# Patient Record
Sex: MALE | Race: WHITE | Employment: FULL TIME | URBAN - METROPOLITAN AREA
[De-identification: names, ages, dates, MRNs, and addresses within clinical notes are randomized per-mention and may not be internally consistent; named-entity substitution may affect disease eponyms.]

---

## 2018-05-24 ENCOUNTER — OFFICE VISIT (OUTPATIENT)
Dept: FAMILY MEDICINE | Facility: CLINIC | Age: 53
End: 2018-05-24
Payer: COMMERCIAL

## 2018-05-24 VITALS
RESPIRATION RATE: 16 BRPM | WEIGHT: 241.25 LBS | DIASTOLIC BLOOD PRESSURE: 101 MMHG | SYSTOLIC BLOOD PRESSURE: 141 MMHG | HEIGHT: 76 IN | BODY MASS INDEX: 29.38 KG/M2 | HEART RATE: 105 BPM | OXYGEN SATURATION: 97 % | TEMPERATURE: 98.7 F

## 2018-05-24 DIAGNOSIS — M10.9 ACUTE GOUTY ARTHRITIS: ICD-10-CM

## 2018-05-24 DIAGNOSIS — I10 BENIGN ESSENTIAL HYPERTENSION: Primary | ICD-10-CM

## 2018-05-24 DIAGNOSIS — E78.5 HYPERLIPIDEMIA LDL GOAL <100: ICD-10-CM

## 2018-05-24 DIAGNOSIS — R73.01 IMPAIRED FASTING GLUCOSE: ICD-10-CM

## 2018-05-24 LAB
ALBUMIN SERPL-MCNC: 3.6 G/DL (ref 3.4–5)
ALP SERPL-CCNC: 76 U/L (ref 40–150)
ALT SERPL W P-5'-P-CCNC: 20 U/L (ref 0–70)
ANION GAP SERPL CALCULATED.3IONS-SCNC: 9 MMOL/L (ref 3–14)
AST SERPL W P-5'-P-CCNC: 10 U/L (ref 0–45)
BILIRUB SERPL-MCNC: 0.6 MG/DL (ref 0.2–1.3)
BUN SERPL-MCNC: 16 MG/DL (ref 7–30)
CALCIUM SERPL-MCNC: 9.5 MG/DL (ref 8.5–10.1)
CHLORIDE SERPL-SCNC: 108 MMOL/L (ref 94–109)
CHOLEST SERPL-MCNC: 177 MG/DL
CO2 SERPL-SCNC: 26 MMOL/L (ref 20–32)
CREAT SERPL-MCNC: 1.09 MG/DL (ref 0.66–1.25)
GFR SERPL CREATININE-BSD FRML MDRD: 70 ML/MIN/1.7M2
GLUCOSE SERPL-MCNC: 97 MG/DL (ref 70–99)
HBA1C MFR BLD: 5.1 % (ref 0–5.6)
HDLC SERPL-MCNC: 37 MG/DL
LDLC SERPL CALC-MCNC: 107 MG/DL
NONHDLC SERPL-MCNC: 140 MG/DL
POTASSIUM SERPL-SCNC: 4.9 MMOL/L (ref 3.4–5.3)
PROT SERPL-MCNC: 7.8 G/DL (ref 6.8–8.8)
SODIUM SERPL-SCNC: 143 MMOL/L (ref 133–144)
TRIGL SERPL-MCNC: 167 MG/DL
URATE SERPL-MCNC: 6.2 MG/DL (ref 3.5–7.2)

## 2018-05-24 PROCEDURE — 80053 COMPREHEN METABOLIC PANEL: CPT | Performed by: FAMILY MEDICINE

## 2018-05-24 PROCEDURE — 83036 HEMOGLOBIN GLYCOSYLATED A1C: CPT | Performed by: FAMILY MEDICINE

## 2018-05-24 PROCEDURE — 99214 OFFICE O/P EST MOD 30 MIN: CPT | Performed by: FAMILY MEDICINE

## 2018-05-24 PROCEDURE — 84550 ASSAY OF BLOOD/URIC ACID: CPT | Performed by: FAMILY MEDICINE

## 2018-05-24 PROCEDURE — 80061 LIPID PANEL: CPT | Performed by: FAMILY MEDICINE

## 2018-05-24 PROCEDURE — 36415 COLL VENOUS BLD VENIPUNCTURE: CPT | Performed by: FAMILY MEDICINE

## 2018-05-24 RX ORDER — SIMVASTATIN 10 MG
10 TABLET ORAL DAILY
Refills: 3 | COMMUNITY
Start: 2018-03-25 | End: 2018-09-29

## 2018-05-24 RX ORDER — LISINOPRIL 5 MG/1
5 TABLET ORAL DAILY
Qty: 30 TABLET | Refills: 3 | Status: SHIPPED | OUTPATIENT
Start: 2018-05-24 | End: 2018-09-16

## 2018-05-24 RX ORDER — INDOMETHACIN 50 MG/1
50 CAPSULE ORAL
Qty: 42 CAPSULE | Refills: 1 | Status: SHIPPED | OUTPATIENT
Start: 2018-05-24 | End: 2019-04-18

## 2018-05-24 NOTE — PROGRESS NOTES
SUBJECTIVE:   Adelfo Rivera is a 53 year old male who presents to clinic today for the following health issues:    HPI    Well-known to me from previous practice here to follow up on chronic medical conditions.    He has had sudden increased pain at base of LEFT great toe.  It is red, hot and swollen.  He has never had gout before.  Pain is exquisite to this site.  No other locations affected.    Blood pressure remains elevated.  He has been checking at home with a cuff from [facility] with variable results.  His dbp has never been < 87.  Denies any CP or SOB or HA.    He is on a statin for elevated lipids.  He is due for labs today.    He has history of impaired glucose.  He desires lab to recheck today.    Current Chronic Medical Conditions:  Hyperlipidemia on statin  elevated blood pressure without dx of HTN  impaired fasting glucose    Past Medical History  LLE DVT  of unknown etiology-- on warfarin x 3 months    Family History  Dad-  age 82 from CAD, s/p heart valve replacement, CVA at age 71  Mom- alive and healthy  4 sisters healthy    Social History   to wife Sarah (previous kidney cancer  rxed at [facility]). 2 kids-- age 24F(step) and 18F(biological). Works as  for [employer]- travels long distances in car frequently for job. Nonsmoker.    Problem list and histories reviewed & adjusted, as indicated.  Additional history: as documented        Patient Active Problem List   Diagnosis     Hyperlipidemia LDL goal <100     Acute gouty arthritis     Benign essential hypertension     Impaired fasting glucose     History reviewed. No pertinent surgical history.    Social History   Substance Use Topics     Smoking status: Never Smoker     Smokeless tobacco: Never Used     Alcohol use Yes      Comment: once in a while      Family History   Problem Relation Age of Onset     No Known Problems Mother      No Known Problems Father          Current Outpatient Prescriptions   Medication Sig  "Dispense Refill     BABY ASPIRIN PO Take by mouth daily       Fish Oil-Cholecalciferol (FISH OIL + D3 PO)        indomethacin (INDOCIN) 50 MG capsule Take 1 capsule (50 mg) by mouth 3 times daily (with meals) 42 capsule 1     lisinopril (PRINIVIL/ZESTRIL) 5 MG tablet Take 1 tablet (5 mg) by mouth daily 30 tablet 3     simvastatin (ZOCOR) 10 MG tablet Take 10 mg by mouth daily  3     UNABLE TO FIND MEDICATION NAME: violeta medication- take it daily.       Not on File  BP Readings from Last 3 Encounters:   05/24/18 (!) 141/101    Wt Readings from Last 3 Encounters:   05/24/18 241 lb 4 oz (109.4 kg)                    ROS:  Constitutional, HEENT, cardiovascular, pulmonary, gi and gu systems are negative, except as otherwise noted.    OBJECTIVE:     BP (!) 141/101  Pulse 105  Temp 98.7  F (37.1  C) (Oral)  Resp 16  Ht 6' 4\" (1.93 m)  Wt 241 lb 4 oz (109.4 kg)  SpO2 97%  BMI 29.37 kg/m2  Body mass index is 29.37 kg/(m^2).  GENERAL: healthy, alert and no distress  EYES: Eyes grossly normal to inspection, PERRL and conjunctivae and sclerae normal  NECK: no adenopathy, no asymmetry, masses, or scars and thyroid normal to palpation  MS: no gross musculoskeletal defects noted, base of LEFT great toe at MTP joint red, hot, swollen, pain with movement  SKIN: no suspicious lesions or rashes  NEURO: Normal strength and tone, mentation intact and speech normal  PSYCH: mentation appears normal, affect normal/bright    ASSESSMENT/PLAN:     1. Acute gouty arthritis    - Uric acid  - indomethacin (INDOCIN) 50 MG capsule; Take 1 capsule (50 mg) by mouth 3 times daily (with meals)  Dispense: 42 capsule; Refill: 1    Rx for Indocin today for pain relief.  Recommend increased fluids.  Uric acid level pending.  FU if no change or worsening symptoms prn.    2. Benign essential hypertension    - Comprehensive metabolic panel  - lisinopril (PRINIVIL/ZESTRIL) 5 MG tablet; Take 1 tablet (5 mg) by mouth daily  Dispense: 30 tablet; Refill: " 3    Will start lisinopril 5 mg once daily.  Recheck in 2 weeks for further titration prn.    3. Hyperlipidemia LDL goal <100    - Lipid panel reflex to direct LDL Fasting    On statin- recheck of labs today.    4. Impaired fasting glucose    - Hemoglobin A1c  - Comprehensive metabolic panel    Recheck labs today.  Continues to work at diet and exercise and weight management.    Patient is well-known to me from previous practice where he was an established patient of mine at [facility].    Gemma Infante MD  [facility]

## 2018-05-24 NOTE — MR AVS SNAPSHOT
"              After Visit Summary   2018    Adelfo Rivera    MRN: [id number]           Patient Information     Date Of Birth          1965        Visit Information        Provider Department      2018 9:20 AM Gemma Infante MD [facility]        Today's Diagnoses     Benign essential hypertension    -  1    Acute gouty arthritis        Hyperlipidemia LDL goal <100        Impaired fasting glucose           Follow-ups after your visit        Who to contact     If you have questions or need follow up information about today's clinic visit or your schedule please contact [facility] directly at [phone].  Normal or non-critical lab and imaging results will be communicated to you by PoshVinehart, letter or phone within 4 business days after the clinic has received the results. If you do not hear from us within 7 days, please contact the clinic through PoshVinehart or phone. If you have a critical or abnormal lab result, we will notify you by phone as soon as possible.  Submit refill requests through Silent Power or call your pharmacy and they will forward the refill request to us. Please allow 3 business days for your refill to be completed.          Additional Information About Your Visit        MyChart Information     Sil[employer] lets you send messages to your doctor, view your test results, renew your prescriptions, schedule appointments and more. To sign up, go to [email or web address] . Click on \"Log in\" on the left side of the screen, which will take you to the Welcome page. Then click on \"Sign up Now\" on the right side of the page.     You will be asked to enter the access code listed below, as well as some personal information. Please follow the directions to create your username and password.     Your access code is: [email or web address]  Expires: 2018 10:02 AM     Your access code will  in 90 days. If you need help or a new code, please call " "your [facility] clinic or [phone].        Care EveryWhere ID     This is your Care EveryWhere ID. This could be used by other organizations to access your [facility] medical records  [id number]        Your Vitals Were     Pulse Temperature Respirations Height Pulse Oximetry BMI (Body Mass Index)    105 98.7  F (37.1  C) (Oral) 16 6' 4\" (1.93 m) 97% 29.37 kg/m2       Blood Pressure from Last 3 Encounters:   05/24/18 (!) 141/101    Weight from Last 3 Encounters:   05/24/18 241 lb 4 oz (109.4 kg)              We Performed the Following     Comprehensive metabolic panel     Hemoglobin A1c     Lipid panel reflex to direct LDL Fasting     Uric acid          Today's Medication Changes          These changes are accurate as of 5/24/18 10:02 AM.  If you have any questions, ask your nurse or doctor.               Start taking these medicines.        Dose/Directions    indomethacin 50 MG capsule   Commonly known as:  INDOCIN   Used for:  Acute gouty arthritis   Started by:  Gemma Infante MD        Dose:  50 mg   Take 1 capsule (50 mg) by mouth 3 times daily (with meals)   Quantity:  42 capsule   Refills:  1       lisinopril 5 MG tablet   Commonly known as:  PRINIVIL/ZESTRIL   Used for:  Benign essential hypertension   Started by:  Gemma Infante MD        Dose:  5 mg   Take 1 tablet (5 mg) by mouth daily   Quantity:  30 tablet   Refills:  3            Where to get your medicines      These medications were sent to [facility]/pharmacy #[id number] - [facility] [address]  [address] [facility] [address]     Phone:  [phone]     indomethacin 50 MG capsule    lisinopril 5 MG tablet                Primary Care Provider Office Phone # Fax #    Gemma Infante [phone] [address] [place] [address]        Equal Access to Services     MASSIMO BAHENA AH: Mike Moctezuma, mecca heck, qaybta kaalmadionisio " ronda cabreraeliceo busbyaan ah. Yadira [facility] [phone].    ATENCIÓN: Si habla tawny, tiene a dias disposición servicios gratuitos de asistencia lingüística. Branden al [phone].    We comply with applicable federal civil rights laws and [place] laws. We do not discriminate on the basis of race, color, national origin, age, disability, sex, sexual orientation, or gender identity.            Thank you!     Thank you for choosing [facility]  for your care. Our goal is always to provide you with excellent care. Hearing back from our patients is one way we can continue to improve our services. Please take a few minutes to complete the written survey that you may receive in the mail after your visit with us. Thank you!             Your Updated Medication List - Protect others around you: Learn how to safely use, store and throw away your medicines at www.disposemymeds.org.          This list is accurate as of 5/24/18 10:02 AM.  Always use your most recent med list.                   Brand Name Dispense Instructions for use Diagnosis    BABY ASPIRIN PO      Take by mouth daily        FISH OIL + D3 PO           indomethacin 50 MG capsule    INDOCIN    42 capsule    Take 1 capsule (50 mg) by mouth 3 times daily (with meals)    Acute gouty arthritis       lisinopril 5 MG tablet    PRINIVIL/ZESTRIL    30 tablet    Take 1 tablet (5 mg) by mouth daily    Benign essential hypertension       simvastatin 10 MG tablet    ZOCOR     Take 10 mg by mouth daily        UNABLE TO FIND      MEDICATION NAME: naicin medication- take it daily.

## 2018-05-25 ENCOUNTER — TELEPHONE (OUTPATIENT)
Dept: FAMILY MEDICINE | Facility: CLINIC | Age: 53
End: 2018-05-25

## 2018-05-25 NOTE — TELEPHONE ENCOUNTER
Form completed.  Form faxed to attn: Data Department [phone].    A copy mailed to patient's home address on file and a copy sent down for abstraction.     Cecilia Etienne MA

## 2018-09-29 DIAGNOSIS — E78.5 HYPERLIPIDEMIA LDL GOAL <100: Primary | ICD-10-CM

## 2018-09-29 NOTE — TELEPHONE ENCOUNTER
"Requested Prescriptions   Pending Prescriptions Disp Refills     simvastatin (ZOCOR) 10 MG tablet  Medication is historical  Last Written Prescription Date:  3/25/2018  Last Fill Quantity: ,  # refills: 3   Last office visit: 5/24/2018 with prescribing provider:  Naresh   Future Office Visit:     30 tablet 3     Sig: Take 1 tablet (10 mg) by mouth daily    Statins Protocol Passed    9/29/2018  8:49 AM       Passed - LDL on file in past 12 months    Recent Labs   Lab Test  05/24/18   1005   LDL  107*            Passed - No abnormal creatine kinase in past 12 months    No lab results found.            Passed - Recent (12 mo) or future (30 days) visit within the authorizing provider's specialty    Patient had office visit in the last 12 months or has a visit in the next 30 days with authorizing provider or within the authorizing provider's specialty.  See \"Patient Info\" tab in inbasket, or \"Choose Columns\" in Meds & Orders section of the refill encounter.           Passed - Patient is age 18 or older          "

## 2018-10-01 RX ORDER — SIMVASTATIN 10 MG
10 TABLET ORAL DAILY
Qty: 90 TABLET | Refills: 3 | Status: SHIPPED | OUTPATIENT
Start: 2018-10-01 | End: 2019-04-18

## 2019-02-08 ENCOUNTER — TELEPHONE (OUTPATIENT)
Dept: URGENT CARE | Facility: URGENT CARE | Age: 54
End: 2019-02-08

## 2019-02-08 ENCOUNTER — TELEPHONE (OUTPATIENT)
Dept: FAMILY MEDICINE | Facility: CLINIC | Age: 54
End: 2019-02-08

## 2019-02-08 NOTE — TELEPHONE ENCOUNTER
PT declined scheduling BP check at the moment. He states that he will call back and schedule that with his yearly physical.     Carmen Wood MA

## 2019-04-18 ENCOUNTER — OFFICE VISIT (OUTPATIENT)
Dept: FAMILY MEDICINE | Facility: CLINIC | Age: 54
End: 2019-04-18
Payer: COMMERCIAL

## 2019-04-18 VITALS
RESPIRATION RATE: 16 BRPM | BODY MASS INDEX: 27.03 KG/M2 | HEIGHT: 76 IN | SYSTOLIC BLOOD PRESSURE: 134 MMHG | HEART RATE: 108 BPM | WEIGHT: 222 LBS | OXYGEN SATURATION: 99 % | TEMPERATURE: 98.5 F | DIASTOLIC BLOOD PRESSURE: 87 MMHG

## 2019-04-18 DIAGNOSIS — Z12.5 SCREENING FOR PROSTATE CANCER: ICD-10-CM

## 2019-04-18 DIAGNOSIS — E78.5 HYPERLIPIDEMIA LDL GOAL <100: ICD-10-CM

## 2019-04-18 DIAGNOSIS — Z00.00 ROUTINE GENERAL MEDICAL EXAMINATION AT A HEALTH CARE FACILITY: Primary | ICD-10-CM

## 2019-04-18 DIAGNOSIS — M10.9 ACUTE GOUTY ARTHRITIS: ICD-10-CM

## 2019-04-18 DIAGNOSIS — R73.01 IMPAIRED FASTING GLUCOSE: ICD-10-CM

## 2019-04-18 DIAGNOSIS — I10 BENIGN ESSENTIAL HYPERTENSION: ICD-10-CM

## 2019-04-18 LAB
ALBUMIN SERPL-MCNC: 3.8 G/DL (ref 3.4–5)
ALP SERPL-CCNC: 77 U/L (ref 40–150)
ALT SERPL W P-5'-P-CCNC: 22 U/L (ref 0–70)
ANION GAP SERPL CALCULATED.3IONS-SCNC: 9 MMOL/L (ref 3–14)
AST SERPL W P-5'-P-CCNC: 16 U/L (ref 0–45)
BASOPHILS # BLD AUTO: 0 10E9/L (ref 0–0.2)
BASOPHILS NFR BLD AUTO: 0.3 %
BILIRUB SERPL-MCNC: 0.6 MG/DL (ref 0.2–1.3)
BUN SERPL-MCNC: 12 MG/DL (ref 7–30)
CALCIUM SERPL-MCNC: 9.7 MG/DL (ref 8.5–10.1)
CHLORIDE SERPL-SCNC: 106 MMOL/L (ref 94–109)
CHOLEST SERPL-MCNC: 232 MG/DL
CO2 SERPL-SCNC: 24 MMOL/L (ref 20–32)
CREAT SERPL-MCNC: 0.87 MG/DL (ref 0.66–1.25)
DIFFERENTIAL METHOD BLD: NORMAL
EOSINOPHIL # BLD AUTO: 0.1 10E9/L (ref 0–0.7)
EOSINOPHIL NFR BLD AUTO: 0.9 %
ERYTHROCYTE [DISTWIDTH] IN BLOOD BY AUTOMATED COUNT: 13.9 % (ref 10–15)
GFR SERPL CREATININE-BSD FRML MDRD: >90 ML/MIN/{1.73_M2}
GLUCOSE SERPL-MCNC: 91 MG/DL (ref 70–99)
HBA1C MFR BLD: 5 % (ref 0–5.6)
HCT VFR BLD AUTO: 45.4 % (ref 40–53)
HCV AB SERPL QL IA: NONREACTIVE
HDLC SERPL-MCNC: 55 MG/DL
HGB BLD-MCNC: 15.1 G/DL (ref 13.3–17.7)
HIV 1+2 AB+HIV1 P24 AG SERPL QL IA: NONREACTIVE
LDLC SERPL CALC-MCNC: 149 MG/DL
LYMPHOCYTES # BLD AUTO: 1 10E9/L (ref 0.8–5.3)
LYMPHOCYTES NFR BLD AUTO: 14.9 %
MCH RBC QN AUTO: 30 PG (ref 26.5–33)
MCHC RBC AUTO-ENTMCNC: 33.3 G/DL (ref 31.5–36.5)
MCV RBC AUTO: 90 FL (ref 78–100)
MONOCYTES # BLD AUTO: 0.7 10E9/L (ref 0–1.3)
MONOCYTES NFR BLD AUTO: 9.9 %
NEUTROPHILS # BLD AUTO: 5.2 10E9/L (ref 1.6–8.3)
NEUTROPHILS NFR BLD AUTO: 74 %
NONHDLC SERPL-MCNC: 177 MG/DL
PLATELET # BLD AUTO: 180 10E9/L (ref 150–450)
POTASSIUM SERPL-SCNC: 4.2 MMOL/L (ref 3.4–5.3)
PROT SERPL-MCNC: 7.5 G/DL (ref 6.8–8.8)
PSA SERPL-ACNC: 0.95 UG/L (ref 0–4)
RBC # BLD AUTO: 5.03 10E12/L (ref 4.4–5.9)
SODIUM SERPL-SCNC: 139 MMOL/L (ref 133–144)
TRIGL SERPL-MCNC: 141 MG/DL
URATE SERPL-MCNC: 6.9 MG/DL (ref 3.5–7.2)
WBC # BLD AUTO: 7 10E9/L (ref 4–11)

## 2019-04-18 PROCEDURE — 36415 COLL VENOUS BLD VENIPUNCTURE: CPT | Performed by: FAMILY MEDICINE

## 2019-04-18 PROCEDURE — 99396 PREV VISIT EST AGE 40-64: CPT | Mod: 25 | Performed by: FAMILY MEDICINE

## 2019-04-18 PROCEDURE — 84550 ASSAY OF BLOOD/URIC ACID: CPT | Performed by: FAMILY MEDICINE

## 2019-04-18 PROCEDURE — 83036 HEMOGLOBIN GLYCOSYLATED A1C: CPT | Performed by: FAMILY MEDICINE

## 2019-04-18 PROCEDURE — 86803 HEPATITIS C AB TEST: CPT | Performed by: FAMILY MEDICINE

## 2019-04-18 PROCEDURE — 99214 OFFICE O/P EST MOD 30 MIN: CPT | Mod: 25 | Performed by: FAMILY MEDICINE

## 2019-04-18 PROCEDURE — 80053 COMPREHEN METABOLIC PANEL: CPT | Performed by: FAMILY MEDICINE

## 2019-04-18 PROCEDURE — 85025 COMPLETE CBC W/AUTO DIFF WBC: CPT | Performed by: FAMILY MEDICINE

## 2019-04-18 PROCEDURE — G0103 PSA SCREENING: HCPCS | Performed by: FAMILY MEDICINE

## 2019-04-18 PROCEDURE — 90715 TDAP VACCINE 7 YRS/> IM: CPT | Performed by: FAMILY MEDICINE

## 2019-04-18 PROCEDURE — 90471 IMMUNIZATION ADMIN: CPT | Performed by: FAMILY MEDICINE

## 2019-04-18 PROCEDURE — 87389 HIV-1 AG W/HIV-1&-2 AB AG IA: CPT | Performed by: FAMILY MEDICINE

## 2019-04-18 PROCEDURE — 80061 LIPID PANEL: CPT | Performed by: FAMILY MEDICINE

## 2019-04-18 RX ORDER — LISINOPRIL 10 MG/1
10 TABLET ORAL DAILY
Qty: 90 TABLET | Refills: 3 | Status: SHIPPED | OUTPATIENT
Start: 2019-04-18 | End: 2019-07-23

## 2019-04-18 RX ORDER — SIMVASTATIN 10 MG
10 TABLET ORAL DAILY
Qty: 90 TABLET | Refills: 3 | Status: ON HOLD | OUTPATIENT
Start: 2019-04-18 | End: 2019-05-23

## 2019-04-18 ASSESSMENT — ENCOUNTER SYMPTOMS
CONSTIPATION: 0
PALPITATIONS: 0
DIZZINESS: 0
SHORTNESS OF BREATH: 0
ABDOMINAL PAIN: 0
WEAKNESS: 0
FREQUENCY: 0
COUGH: 0
PARESTHESIAS: 0
FEVER: 0
JOINT SWELLING: 0
HEADACHES: 0
CHILLS: 0
HEMATURIA: 0
DIARRHEA: 0
SORE THROAT: 0
EYE PAIN: 0
ARTHRALGIAS: 1
HEARTBURN: 0
HEMATOCHEZIA: 0
NERVOUS/ANXIOUS: 0
MYALGIAS: 1
DYSURIA: 0

## 2019-04-18 ASSESSMENT — MIFFLIN-ST. JEOR: SCORE: 1943.49

## 2019-04-18 NOTE — NURSING NOTE
Prior to injection, verified patient identity using patient's name and date of birth.  Due to injection administration, patient instructed to remain in clinic for 15 minutes  afterwards, and to report any adverse reaction to me immediately.    Screening Questionnaire for Adult Immunization    Are you sick today?   No   Do you have allergies to medications, food, a vaccine component or latex?   No   Have you ever had a serious reaction after receiving a vaccination?   No   Do you have a long-term health problem with heart disease, lung disease, asthma, kidney disease, metabolic disease (e.g. diabetes), anemia, or other blood disorder?   No   Do you have cancer, leukemia, HIV/AIDS, or any other immune system problem?   No   In the past 3 months, have you taken medications that affect  your immune system, such as prednisone, other steroids, or anticancer drugs; drugs for the treatment of rheumatoid arthritis, Crohn s disease, or psoriasis; or have you had radiation treatments?   No   Have you had a seizure, or a brain or other nervous system problem?   No   During the past year, have you received a transfusion of blood or blood     products, or been given immune (gamma) globulin or antiviral drug?   No   For women: Are you pregnant or is there a chance you could become        pregnant during the next month?   No   Have you received any vaccinations in the past 4 weeks?   No     Immunization questionnaire answers were all negative.        Per orders of Dr. Infante, injection of Tdap given by Yuly Brower. Patient instructed to remain in clinic for 15 minutes afterwards, and to report any adverse reaction to me immediately.       Screening performed by Yuly Brower on 4/18/2019 at 8:42 AM.

## 2019-04-18 NOTE — PROGRESS NOTES
SUBJECTIVE:   CC: Adelfo Rivera is an 54 year old male who presents for preventative health visit.     Healthy Habits:     Getting at least 3 servings of Calcium per day:  Yes    Bi-annual eye exam:  Yes    Dental care twice a year:  Yes    Sleep apnea or symptoms of sleep apnea:  None    Diet:  Regular (no restrictions)    Frequency of exercise:  2-3 days/week    Duration of exercise:  15-30 minutes    Taking medications regularly:  Yes    Medication side effects:  None    PHQ-2 Total Score: 0    Additional concerns today:  No    Today's PHQ-2 Score:   PHQ-2 (  [employer]) 2019   Q1: Little interest or pleasure in doing things 0   Q2: Feeling down, depressed or hopeless 0   PHQ-2 Score 0   Q1: Little interest or pleasure in doing things Not at all   Q2: Feeling down, depressed or hopeless Not at all   PHQ-2 Score 0     Current Chronic Medical Conditions:  Hyperlipidemia on statin  HTN  impaired fasting glucose  gout    Past Medical History  LLE DVT  of unknown etiology-- on warfarin x 3 months    Family History  Dad-  age 82 from CAD, s/p heart valve replacement, CVA at age 71  Mom- alive and healthy  4 sisters healthy    Social History   to wife Sarah (previous kidney cancer  rxed at [facility]). 2 kids-- age 25F Vani(step) and 19F Delia(biological). Works as  for [employer]- travels long distances in car frequently for job. Nonsmoker.     HCM  Colonoscopy  at [facility] q 10 year Follow-up    Additional Concerns  Blood pressure remains elevated.  Has been struggling with LEFT knee pain and to lesser degree RIGHT knee.  Has gotten cortisone shot for pain.  Considering eventual knee replacement.  Currently on lisinopril 5 mg.  Hs lost 20 pounds with change in diet- gluten-free.  Blood pressure still up with pain.  Due for fasting lipids and refill of statin.  Tolerating well.  Hx of gout.  No recent flares.  Not on maintenance medication for uric acid.  Hx of  impaired fasting glucose- recheck today with recent weight loss.    Abuse: Current or Past(Physical, Sexual or Emotional)- No  Do you feel safe in your environment? Yes    Social History     Tobacco Use     Smoking status: Never Smoker     Smokeless tobacco: Never Used   Substance Use Topics     Alcohol use: Yes     Comment: once in a while      If you drink alcohol do you typically have >3 drinks per day or >7 drinks per week? No    Alcohol Use 4/18/2019   Prescreen: >3 drinks/day or >7 drinks/week? No   Prescreen: >3 drinks/day or >7 drinks/week? -       Last PSA: No results found for: PSA    Reviewed orders with patient. Reviewed health maintenance and updated orders accordingly - Yes  BP Readings from Last 3 Encounters:   04/18/19 134/87   05/24/18 (!) 141/101    Wt Readings from Last 3 Encounters:   04/18/19 100.7 kg (222 lb)   05/24/18 109.4 kg (241 lb 4 oz)                  Patient Active Problem List   Diagnosis     Hyperlipidemia LDL goal <100     Acute gouty arthritis     Benign essential hypertension     Impaired fasting glucose     History reviewed. No pertinent surgical history.    Social History     Tobacco Use     Smoking status: Never Smoker     Smokeless tobacco: Never Used   Substance Use Topics     Alcohol use: Yes     Comment: once in a while      Family History   Problem Relation Age of Onset     No Known Problems Mother      No Known Problems Father          Current Outpatient Medications   Medication Sig Dispense Refill     BABY ASPIRIN PO Take by mouth daily       Fish Oil-Cholecalciferol (FISH OIL + D3 PO)        lisinopril (PRINIVIL/ZESTRIL) 10 MG tablet Take 1 tablet (10 mg) by mouth daily 90 tablet 3     lisinopril (PRINIVIL/ZESTRIL) 5 MG tablet TAKE 1 TABLET BY MOUTH EVERY DAY 90 tablet 3     NIACIN-50 PO Take by mouth daily       simvastatin (ZOCOR) 10 MG tablet Take 1 tablet (10 mg) by mouth daily 90 tablet 3     TURMERIC PO Take by mouth daily       piroxicam (FELDENE) 20 MG capsule  "TAKE 1 CAPSULE BY MOUTH EVERY DAY WITH FOOD  1     No Known Allergies  Recent Labs   Lab Test 05/24/18  1005   A1C 5.1   *   HDL 37*   TRIG 167*   ALT 20   CR 1.09   GFRESTIMATED 70   GFRESTBLACK 85   POTASSIUM 4.9        Reviewed and updated as needed this visit by clinical staff  Tobacco  Allergies  Meds  Med Hx  Surg Hx  Fam Hx  Soc Hx        Reviewed and updated as needed this visit by Provider            Review of Systems   Constitutional: Negative for chills and fever.   HENT: Positive for hearing loss. Negative for congestion, ear pain and sore throat.    Eyes: Negative for pain and visual disturbance.   Respiratory: Negative for cough and shortness of breath.    Cardiovascular: Negative for chest pain, palpitations and peripheral edema.   Gastrointestinal: Negative for abdominal pain, constipation, diarrhea, heartburn and hematochezia.   Genitourinary: Negative for discharge, dysuria, frequency, genital sores, hematuria, impotence and urgency.   Musculoskeletal: Positive for arthralgias and myalgias. Negative for joint swelling.   Skin: Negative for rash.   Neurological: Negative for dizziness, weakness, headaches and paresthesias.   Psychiatric/Behavioral: Negative for mood changes. The patient is not nervous/anxious.        OBJECTIVE:   /87   Pulse 108   Temp 98.5  F (36.9  C) (Oral)   Resp 16   Ht 1.93 m (6' 4\")   Wt 100.7 kg (222 lb)   SpO2 99%   BMI 27.02 kg/m      Physical Exam  GENERAL: healthy, alert and no distress  EYES: Eyes grossly normal to inspection, PERRL and conjunctivae and sclerae normal  HENT: ear canals and TM's normal, nose and mouth without ulcers or lesions  NECK: no adenopathy, no asymmetry, masses, or scars and thyroid normal to palpation  RESP: lungs clear to auscultation - no rales, rhonchi or wheezes  CV: regular rate and rhythm, normal S1 S2, no S3 or S4, no murmur, click or rub, no peripheral edema and peripheral pulses strong  ABDOMEN: soft, " nontender, no hepatosplenomegaly, no masses and bowel sounds normal  MS: no gross musculoskeletal defects noted, no edema  SKIN: no suspicious lesions or rashes  NEURO: Normal strength and tone, mentation intact and speech normal  PSYCH: mentation appears normal, affect normal/bright  LYMPH: no cervical, supraclavicular, axillary, or inguinal adenopathy    ASSESSMENT/PLAN:   1. Routine general medical examination at a health care facility    - TDAP VACCINE (ADACEL)  - CBC with platelets differential  - HIV Antigen Antibody Combo  - Hepatitis C Screen Reflex to HCV RNA Quant and Genotype    Fasting labs today.  Tdap updated.  Continue improved diet and exercise and weight management,    2. Screening for prostate cancer    - Prostate spec antigen screen     PSA screen today.    3. Benign essential hypertension    - Comprehensive metabolic panel  - lisinopril (PRINIVIL/ZESTRIL) 10 MG tablet; Take 1 tablet (10 mg) by mouth daily  Dispense: 90 tablet; Refill: 3    Blood pressure up today-- will increase lisinopril to 10mg-- will monitor with new home monitor and report bps to me in next month.  Believe pain is keeping pressure high and as this is addressed with ORTHO and controlled- may decrease ACE prn.    4. Hyperlipidemia LDL goal <100    - Comprehensive metabolic panel  - Lipid panel reflex to direct LDL Fasting  - simvastatin (ZOCOR) 10 MG tablet; Take 1 tablet (10 mg) by mouth daily  Dispense: 90 tablet; Refill: 3    Recheck lipids today.  Refill statin.    5. Acute gouty arthritis    - Uric acid    Recheck uric acid today.    6. Impaired fasting glucose    - Comprehensive metabolic panel  - Hemoglobin A1c    Recheck with recent weight loss-    COUNSELING:   Reviewed preventive health counseling, as reflected in patient instructions       Regular exercise       Healthy diet/nutrition    BP Readings from Last 1 Encounters:   04/18/19 134/87     Estimated body mass index is 27.02 kg/m  as calculated from the  "following:    Height as of this encounter: 1.93 m (6' 4\").    Weight as of this encounter: 100.7 kg (222 lb).      Weight management plan: Discussed healthy diet and exercise guidelines     reports that he has never smoked. He has never used smokeless tobacco.      Counseling Resources:  ATP IV Guidelines  Pooled Cohorts Equation Calculator  FRAX Risk Assessment  ICSI Preventive Guidelines  Dietary Guidelines for Americans, 2010  USDA's MyPlate  ASA Prophylaxis  Lung CA Screening    Gemma Infante MD  [facility]  "

## 2019-04-18 NOTE — LETTER
April 22, 2019      Adelfo Rivera  [id number]  [facility] [id number]        Robin,    The weight loss showed great improvement to your fasting glucose level overall- this is super news!    I am not sure why but your cholesterol slightly worsened.  Your HDL increased which is a desired effect as the HDL is the good form of cholesterol which protects the heart.  Howver, the LDL or bad form of cholesterol which creates plaques, increased as well from 107 to 149.  I would suggest for now- we increase your statin from 10 mg to 20mg to reduce the LDL levels more-- if you are in agreement.  You can take 2 10mg tablets for now and I will send in a new prescription for 20mg tabs moving forward.      We should check another fasting lipid test in 3 months to make sure the increased dose is working. I have put an order on your chart-- you can call any [facility] (there is one in [place] and also [place]) to get this lab redrawn- they just ask you call a day in advance to schedule a lab appointment.    I hope once the knee pain resolves- you will begin to feel much better overall!    Results for orders placed or performed in visit on 04/18/19   CBC with platelets differential   Result Value Ref Range    WBC 7.0 4.0 - 11.0 10e9/L    RBC Count 5.03 4.4 - 5.9 10e12/L    Hemoglobin 15.1 13.3 - 17.7 g/dL    Hematocrit 45.4 40.0 - 53.0 %    MCV 90 78 - 100 fl    MCH 30.0 26.5 - 33.0 pg    MCHC 33.3 31.5 - 36.5 g/dL    RDW 13.9 10.0 - 15.0 %    Platelet Count 180 150 - 450 10e9/L    Diff Method Automated Method     % Neutrophils 74.0 %    % Lymphocytes 14.9 %    % Monocytes 9.9 %    % Eosinophils 0.9 %    % Basophils 0.3 %    Absolute Neutrophil 5.2 1.6 - 8.3 10e9/L    Absolute Lymphocytes 1.0 0.8 - 5.3 10e9/L    Absolute Monocytes 0.7 0.0 - 1.3 10e9/L    Absolute Eosinophils 0.1 0.0 - 0.7 10e9/L    Absolute Basophils 0.0 0.0 - 0.2 10e9/L   Comprehensive metabolic panel   Result Value Ref Range    Sodium 139  133 - 144 mmol/L    Potassium 4.2 3.4 - 5.3 mmol/L    Chloride 106 94 - 109 mmol/L    Carbon Dioxide 24 20 - 32 mmol/L    Anion Gap 9 3 - 14 mmol/L    Glucose 91 70 - 99 mg/dL    Urea Nitrogen 12 7 - 30 mg/dL    Creatinine 0.87 0.66 - 1.25 mg/dL    GFR Estimate >90 >60 mL/min/[1.73_m2]    GFR Estimate If Black >90 >60 mL/min/[1.73_m2]    Calcium 9.7 8.5 - 10.1 mg/dL    Bilirubin Total 0.6 0.2 - 1.3 mg/dL    Albumin 3.8 3.4 - 5.0 g/dL    Protein Total 7.5 6.8 - 8.8 g/dL    Alkaline Phosphatase 77 40 - 150 U/L    ALT 22 0 - 70 U/L    AST 16 0 - 45 U/L   Lipid panel reflex to direct LDL Fasting   Result Value Ref Range    Cholesterol 232 (H) <200 mg/dL    Triglycerides 141 <150 mg/dL    HDL Cholesterol 55 >39 mg/dL    LDL Cholesterol Calculated 149 (H) <100 mg/dL    Non HDL Cholesterol 177 (H) <130 mg/dL   Hemoglobin A1c   Result Value Ref Range    Hemoglobin A1C 5.0 0 - 5.6 %   Uric acid   Result Value Ref Range    Uric Acid 6.9 3.5 - 7.2 mg/dL   HIV Antigen Antibody Combo   Result Value Ref Range    HIV Antigen Antibody Combo Nonreactive NR^Nonreactive       Hepatitis C Screen Reflex to HCV RNA Quant and Genotype   Result Value Ref Range    Hepatitis C Antibody Nonreactive NR^Nonreactive   Prostate spec antigen screen   Result Value Ref Range    PSA 0.95 0 - 4 ug/L               Sincerely,        Gemma Infante MD/jacob

## 2019-04-21 DIAGNOSIS — E78.5 HYPERLIPIDEMIA LDL GOAL <100: Primary | ICD-10-CM

## 2019-04-21 RX ORDER — SIMVASTATIN 20 MG
20 TABLET ORAL AT BEDTIME
Qty: 90 TABLET | Refills: 3 | Status: ON HOLD | OUTPATIENT
Start: 2019-04-21 | End: 2019-05-25

## 2019-05-23 ENCOUNTER — HOSPITAL ENCOUNTER (EMERGENCY)
Facility: CLINIC | Age: 54
Discharge: ANOTHER HOSPITAL | End: 2019-05-23
Attending: EMERGENCY MEDICINE | Admitting: EMERGENCY MEDICINE
Payer: COMMERCIAL

## 2019-05-23 ENCOUNTER — HOSPITAL ENCOUNTER (OUTPATIENT)
Dept: ULTRASOUND IMAGING | Facility: CLINIC | Age: 54
Discharge: HOME | End: 2019-05-23
Attending: PHYSICIAN ASSISTANT | Admitting: PHYSICIAN ASSISTANT
Payer: COMMERCIAL

## 2019-05-23 ENCOUNTER — HOSPITAL ENCOUNTER (INPATIENT)
Facility: CLINIC | Age: 54
LOS: 2 days | Discharge: HOME | End: 2019-05-25
Attending: INTERNAL MEDICINE | Admitting: INTERNAL MEDICINE
Payer: COMMERCIAL

## 2019-05-23 VITALS
TEMPERATURE: 97.3 F | WEIGHT: 222 LBS | OXYGEN SATURATION: 96 % | SYSTOLIC BLOOD PRESSURE: 133 MMHG | HEIGHT: 76 IN | BODY MASS INDEX: 27.03 KG/M2 | RESPIRATION RATE: 18 BRPM | DIASTOLIC BLOOD PRESSURE: 90 MMHG | HEART RATE: 93 BPM

## 2019-05-23 DIAGNOSIS — E78.5 HYPERLIPIDEMIA LDL GOAL <100: ICD-10-CM

## 2019-05-23 DIAGNOSIS — M79.661 RIGHT CALF PAIN: ICD-10-CM

## 2019-05-23 DIAGNOSIS — I82.401 DEEP VEIN THROMBOSIS (DVT) OF RIGHT LOWER EXTREMITY, UNSPECIFIED CHRONICITY, UNSPECIFIED VEIN (H): ICD-10-CM

## 2019-05-23 DIAGNOSIS — I82.4Y1 ACUTE DEEP VEIN THROMBOSIS (DVT) OF PROXIMAL VEIN OF RIGHT LOWER EXTREMITY (H): Primary | ICD-10-CM

## 2019-05-23 DIAGNOSIS — I82.90 OCCLUSIVE THROMBUS: ICD-10-CM

## 2019-05-23 PROBLEM — I82.409 DVT (DEEP VENOUS THROMBOSIS) (H): Status: ACTIVE | Noted: 2019-05-23

## 2019-05-23 LAB
APTT PPP: 24 SEC (ref 22–37)
BASOPHILS # BLD AUTO: 0 10E9/L (ref 0–0.2)
BASOPHILS NFR BLD AUTO: 0.3 %
DIFFERENTIAL METHOD BLD: NORMAL
EOSINOPHIL # BLD AUTO: 0 10E9/L (ref 0–0.7)
EOSINOPHIL NFR BLD AUTO: 0.6 %
ERYTHROCYTE [DISTWIDTH] IN BLOOD BY AUTOMATED COUNT: 13.5 % (ref 10–15)
ERYTHROCYTE [DISTWIDTH] IN BLOOD BY AUTOMATED COUNT: 13.9 % (ref 10–15)
HCT VFR BLD AUTO: 42.2 % (ref 40–53)
HCT VFR BLD AUTO: 45.8 % (ref 40–53)
HGB BLD-MCNC: 14.2 G/DL (ref 13.3–17.7)
HGB BLD-MCNC: 15 G/DL (ref 13.3–17.7)
IMM GRANULOCYTES # BLD: 0 10E9/L (ref 0–0.4)
IMM GRANULOCYTES NFR BLD: 0.3 %
INR PPP: 0.97 (ref 0.86–1.14)
LYMPHOCYTES # BLD AUTO: 0.9 10E9/L (ref 0.8–5.3)
LYMPHOCYTES NFR BLD AUTO: 14 %
MCH RBC QN AUTO: 29.6 PG (ref 26.5–33)
MCH RBC QN AUTO: 29.8 PG (ref 26.5–33)
MCHC RBC AUTO-ENTMCNC: 32.8 G/DL (ref 31.5–36.5)
MCHC RBC AUTO-ENTMCNC: 33.6 G/DL (ref 31.5–36.5)
MCV RBC AUTO: 89 FL (ref 78–100)
MCV RBC AUTO: 90 FL (ref 78–100)
MONOCYTES # BLD AUTO: 0.5 10E9/L (ref 0–1.3)
MONOCYTES NFR BLD AUTO: 7.2 %
NEUTROPHILS # BLD AUTO: 5.1 10E9/L (ref 1.6–8.3)
NEUTROPHILS NFR BLD AUTO: 77.6 %
NRBC # BLD AUTO: 0 10*3/UL
NRBC BLD AUTO-RTO: 0 /100
NT-PROBNP SERPL-MCNC: 73 PG/ML (ref 0–900)
PLATELET # BLD AUTO: 180 10E9/L (ref 150–450)
PLATELET # BLD AUTO: 204 10E9/L (ref 150–450)
RBC # BLD AUTO: 4.76 10E12/L (ref 4.4–5.9)
RBC # BLD AUTO: 5.07 10E12/L (ref 4.4–5.9)
TROPONIN I SERPL-MCNC: <0.015 UG/L (ref 0–0.04)
WBC # BLD AUTO: 5.9 10E9/L (ref 4–11)
WBC # BLD AUTO: 6.6 10E9/L (ref 4–11)

## 2019-05-23 PROCEDURE — 12000000 ZZH R&B MED SURG/OB

## 2019-05-23 PROCEDURE — 96375 TX/PRO/DX INJ NEW DRUG ADDON: CPT

## 2019-05-23 PROCEDURE — 25000128 H RX IP 250 OP 636: Performed by: EMERGENCY MEDICINE

## 2019-05-23 PROCEDURE — 93971 EXTREMITY STUDY: CPT | Mod: RT

## 2019-05-23 PROCEDURE — 93005 ELECTROCARDIOGRAM TRACING: CPT

## 2019-05-23 PROCEDURE — 06CM3ZZ EXTIRPATION OF MATTER FROM RIGHT FEMORAL VEIN, PERCUTANEOUS APPROACH: ICD-10-PCS | Performed by: RADIOLOGY

## 2019-05-23 PROCEDURE — 06CY3ZZ EXTIRPATION OF MATTER FROM LOWER VEIN, PERCUTANEOUS APPROACH: ICD-10-PCS | Performed by: RADIOLOGY

## 2019-05-23 PROCEDURE — 85025 COMPLETE CBC W/AUTO DIFF WBC: CPT | Performed by: EMERGENCY MEDICINE

## 2019-05-23 PROCEDURE — 99285 EMERGENCY DEPT VISIT HI MDM: CPT | Mod: 25

## 2019-05-23 PROCEDURE — 85027 COMPLETE CBC AUTOMATED: CPT | Performed by: INTERNAL MEDICINE

## 2019-05-23 PROCEDURE — 96365 THER/PROPH/DIAG IV INF INIT: CPT

## 2019-05-23 PROCEDURE — 83880 ASSAY OF NATRIURETIC PEPTIDE: CPT | Performed by: EMERGENCY MEDICINE

## 2019-05-23 PROCEDURE — 85610 PROTHROMBIN TIME: CPT | Performed by: EMERGENCY MEDICINE

## 2019-05-23 PROCEDURE — 99223 1ST HOSP IP/OBS HIGH 75: CPT | Mod: AI | Performed by: INTERNAL MEDICINE

## 2019-05-23 PROCEDURE — 85730 THROMBOPLASTIN TIME PARTIAL: CPT | Performed by: EMERGENCY MEDICINE

## 2019-05-23 PROCEDURE — 96366 THER/PROPH/DIAG IV INF ADDON: CPT

## 2019-05-23 PROCEDURE — 84484 ASSAY OF TROPONIN QUANT: CPT | Performed by: EMERGENCY MEDICINE

## 2019-05-23 PROCEDURE — 36415 COLL VENOUS BLD VENIPUNCTURE: CPT | Performed by: INTERNAL MEDICINE

## 2019-05-23 RX ORDER — MORPHINE SULFATE 2 MG/ML
1 INJECTION, SOLUTION INTRAMUSCULAR; INTRAVENOUS
Status: DISCONTINUED | OUTPATIENT
Start: 2019-05-23 | End: 2019-05-25 | Stop reason: HOSPADM

## 2019-05-23 RX ORDER — METOCLOPRAMIDE 5 MG/1
10 TABLET ORAL EVERY 6 HOURS PRN
Status: DISCONTINUED | OUTPATIENT
Start: 2019-05-23 | End: 2019-05-25 | Stop reason: HOSPADM

## 2019-05-23 RX ORDER — SODIUM CHLORIDE 9 MG/ML
INJECTION, SOLUTION INTRAVENOUS CONTINUOUS
Status: DISCONTINUED | OUTPATIENT
Start: 2019-05-23 | End: 2019-05-24

## 2019-05-23 RX ORDER — ONDANSETRON 4 MG/1
4 TABLET, ORALLY DISINTEGRATING ORAL EVERY 6 HOURS PRN
Status: DISCONTINUED | OUTPATIENT
Start: 2019-05-23 | End: 2019-05-25 | Stop reason: HOSPADM

## 2019-05-23 RX ORDER — LISINOPRIL 10 MG/1
10 TABLET ORAL DAILY
Status: DISCONTINUED | OUTPATIENT
Start: 2019-05-24 | End: 2019-05-25 | Stop reason: HOSPADM

## 2019-05-23 RX ORDER — POTASSIUM CHLORIDE 7.45 MG/ML
10 INJECTION INTRAVENOUS
Status: DISCONTINUED | OUTPATIENT
Start: 2019-05-23 | End: 2019-05-25

## 2019-05-23 RX ORDER — ACETAMINOPHEN 325 MG/1
650 TABLET ORAL EVERY 4 HOURS PRN
Status: DISCONTINUED | OUTPATIENT
Start: 2019-05-23 | End: 2019-05-24 | Stop reason: DRUGHIGH

## 2019-05-23 RX ORDER — SIMVASTATIN 20 MG
20 TABLET ORAL AT BEDTIME
Status: DISCONTINUED | OUTPATIENT
Start: 2019-05-24 | End: 2019-05-25

## 2019-05-23 RX ORDER — AMOXICILLIN 250 MG
1 CAPSULE ORAL 2 TIMES DAILY PRN
Status: DISCONTINUED | OUTPATIENT
Start: 2019-05-23 | End: 2019-05-25 | Stop reason: HOSPADM

## 2019-05-23 RX ORDER — ONDANSETRON 2 MG/ML
4 INJECTION INTRAMUSCULAR; INTRAVENOUS EVERY 6 HOURS PRN
Status: DISCONTINUED | OUTPATIENT
Start: 2019-05-23 | End: 2019-05-25 | Stop reason: HOSPADM

## 2019-05-23 RX ORDER — POTASSIUM CHLORIDE 29.8 MG/ML
20 INJECTION INTRAVENOUS
Status: DISCONTINUED | OUTPATIENT
Start: 2019-05-23 | End: 2019-05-25

## 2019-05-23 RX ORDER — POTASSIUM CHLORIDE 1500 MG/1
20-40 TABLET, EXTENDED RELEASE ORAL
Status: DISCONTINUED | OUTPATIENT
Start: 2019-05-23 | End: 2019-05-25

## 2019-05-23 RX ORDER — OXYCODONE HYDROCHLORIDE 5 MG/1
5-10 TABLET ORAL
Status: DISCONTINUED | OUTPATIENT
Start: 2019-05-23 | End: 2019-05-25 | Stop reason: HOSPADM

## 2019-05-23 RX ORDER — METOCLOPRAMIDE HYDROCHLORIDE 5 MG/ML
10 INJECTION INTRAMUSCULAR; INTRAVENOUS EVERY 6 HOURS PRN
Status: DISCONTINUED | OUTPATIENT
Start: 2019-05-23 | End: 2019-05-25 | Stop reason: HOSPADM

## 2019-05-23 RX ORDER — NALOXONE HYDROCHLORIDE 0.4 MG/ML
.1-.4 INJECTION, SOLUTION INTRAMUSCULAR; INTRAVENOUS; SUBCUTANEOUS
Status: DISCONTINUED | OUTPATIENT
Start: 2019-05-23 | End: 2019-05-25 | Stop reason: HOSPADM

## 2019-05-23 RX ORDER — AMOXICILLIN 250 MG
2 CAPSULE ORAL 2 TIMES DAILY PRN
Status: DISCONTINUED | OUTPATIENT
Start: 2019-05-23 | End: 2019-05-25 | Stop reason: HOSPADM

## 2019-05-23 RX ORDER — POTASSIUM CHLORIDE 1.5 G/1.58G
20-40 POWDER, FOR SOLUTION ORAL
Status: DISCONTINUED | OUTPATIENT
Start: 2019-05-23 | End: 2019-05-25

## 2019-05-23 RX ORDER — ASPIRIN 81 MG/1
81 TABLET, CHEWABLE ORAL DAILY
Status: DISCONTINUED | OUTPATIENT
Start: 2019-05-24 | End: 2019-05-25

## 2019-05-23 RX ORDER — POTASSIUM CL/LIDO/0.9 % NACL 10MEQ/0.1L
10 INTRAVENOUS SOLUTION, PIGGYBACK (ML) INTRAVENOUS
Status: DISCONTINUED | OUTPATIENT
Start: 2019-05-23 | End: 2019-05-25

## 2019-05-23 RX ADMIN — Medication 7000 UNITS: at 17:38

## 2019-05-23 RX ADMIN — HEPARIN SODIUM 1600 UNITS/HR: 10000 INJECTION, SOLUTION INTRAVENOUS at 17:38

## 2019-05-23 ASSESSMENT — ACTIVITIES OF DAILY LIVING (ADL): ADLS_ACUITY_SCORE: 11

## 2019-05-23 ASSESSMENT — MIFFLIN-ST. JEOR: SCORE: 1943.49

## 2019-05-23 ASSESSMENT — ENCOUNTER SYMPTOMS
MYALGIAS: 1
SHORTNESS OF BREATH: 0

## 2019-05-23 NOTE — ED PROVIDER NOTES
History     Chief Complaint:  Dx with blood clot right leg, sent from clinic    HPI   Adelfo Rivera is a 54 year old male who presents to the emergency department today for evaluation after being sent here from clinic with a diagnosis of a blood clot in his right leg. The patient endorses a history of a blood clot in his left leg in 2011, but states this current clot in his right is much more painful. He states his previous clot was treated with Coumadin. He reports he traveled to and from [place] around [facility] (4/21/2019) and noticed his right leg was swollen. He states the swelling and pain was not resolving so he went to [facility] and this was attributed to his arthritis and was given a cortisone shot. He states the pain did not resole so he presented to clinic today for an ultrasound which revealed a blood clot. He states he does a lot of traveling and sitting with his job. The patient denies chest pain and shortness of breath.     Allergies:  No Known Drug Allergies     Medications:    Aspirin  Lisinopril  Piroxicam  Simvastatin    Past Medical History:    Hyperlipidemia  Hypertension  Acute deep venous thrombosis     Past Surgical History:    Knee surgery  Nasal septum surgery    Family History:    History reviewed. No pertinent family history.      Social History:  Smoking Status: Never Smoker  Smokeless Tobacco: Never Used  Drug use: Negative  Alcohol Use: Positive    Marital Status:        Review of Systems   Respiratory: Negative for shortness of breath.    Cardiovascular: Negative for chest pain.   Musculoskeletal: Positive for myalgias.   All other systems reviewed and are negative.      Physical Exam     Patient Vitals for the past 24 hrs:   BP Temp Temp src Pulse Heart Rate Resp SpO2 Height Weight   05/23/19 1751 -- -- -- -- 90 -- 97 % -- --   05/23/19 1750 133/90 -- -- 93 -- -- 97 % -- --   05/23/19 1607 -- -- -- -- -- -- 98 % -- --   05/23/19 1606 -- -- -- -- -- -- 98  "% -- --   05/23/19 1605 -- -- -- -- -- -- 98 % -- --   05/23/19 1604 -- -- -- -- -- -- 98 % -- --   05/23/19 1430 (!) 127/97 97.3  F (36.3  C) Temporal 120 -- 18 98 % 1.93 m (6' 4\") 100.7 kg (222 lb)      Physical Exam  Constitutional: Alert, attentive, GCS 15  HENT:    Nose: Nose normal.    Mouth/Throat: Oropharynx is clear, mucous membranes are moist  Eyes: EOM are normal, anicteric, conjugate gaze  CV: regular rate and rhythm; no murmurs  Chest: Effort normal and breath sounds clear without wheezing or rales, symmetric bilaterally   GI:  non tender. No distension. No guarding or rebound.    MSK: No LE edema, no tenderness to palpation of BLE. Right lower extremity swelling greater then left, distal sensation and DP/PT pulses intact. No significant tenderness. Cap refill in right lower extremity less that 2 seconds   Neurological: Alert, attentive, moving all extremities equally.   Skin: Skin is warm and dry.     Emergency Department Course   ECG:  ECG taken at 1610  Normal sinus rhythm  Normal ECG  Rate 95 bpm. OR interval 150 ms. QRS duration 82 ms. QT/QTc 328/412 ms. P-R-T axes 47 0 36.    Laboratory:  Laboratory findings were communicated with the patient who voiced understanding of the findings.    CBC: WBC 6.6, HGB 15,   Troponin (Collected 1605): <0.015   PTT: 24  INR: 0.97  BNP: 73  Heparin level: Pending    Interventions:  1738 Heparin 1600 units/hr IV  1738 Heparin 7000 units IV    Emergency Department Course:    1521 Nursing notes and vitals reviewed.    1525 I performed an exam of the patient as documented above.     1536 I spoke with Dr. Hassan of the interventional radiology service regarding patient's presentation, findings, and plan of care.     1605 IV was inserted and blood was drawn for laboratory testing, results above.     1610 EKG obtained as noted above.     1620 I spoke with Dr. Joy of the Hospitalist service from [facility] regarding patient's presentation, findings, and " plan of care.     1838 I personally reviewed the laboratory results with the patient and answered all related questions prior to transfer.    Impression & Plan      Medical Decision Making:  Adelfo Rivera is a 54 year male with past medical history of remote provoked DVT off anticoagulation since 2011, hypertension hyperlipidemia who presents for evaluation of now 3 weeks increasing right lower extremity pain and swelling he was found to have large occlusive thrombus extending down from the common femoral vein on outpatient ultrasound done today.  Consents of his clot occurred after traveling home via car from [place], suggestive again provoked DVT and he reports a negative hypercoagulability work-up with his last DVT.  Vital signs on arrival notable for tachycardia to 120s however his O2 sats are greater than 90% on room air and blood pressure is in fact elevated.  He does not have any chest symptoms to suggest PE, troponin and BMP are within normal limits.  EKG shows normal sinus rhythm.  Given the extent of his occlusive thrombus, I did discuss the case with IR who agreed patient would benefit from mechanical thrombectomy and will plan to the procedure tomorrow morning.  He was placed on high-dose heparin drip and transferred via ALS to [facility] for continued monitoring and thrombectomy in the morning.  He remained hemodynamically stable while in the emergency department.    Diagnosis:    ICD-10-CM    1. Occlusive thrombus I82.90 CBC + differential     INR     PTT     Troponin I (now)     BNP     CANCELED: CBC with platelets     Disposition:   The patient is transferred to [facility] for further evaluation and treatment under the care of Dr. Joy.     Mark Morales MD   [employer]  4:55 PM 05/23/19     Scribe Disclosure:  I, Lavern Nur, am serving as a scribe at 3:21 PM on 5/23/2019 to document services personally performed by Mark Morales,  MD based on my observations and the provider's statements to me.     [facility] EMERGENCY DEPARTMENT       Mark Morales MD  05/23/19 9730

## 2019-05-23 NOTE — ED TRIAGE NOTES
Dx with DVT today at clinic, sent here.  Has been having right calf swelling since Easter, had cortisone injection at clinic for knee pain.  Continues to have swelling, went to clinic for follow up today.  Had US, + for DVT.  ABCDs intact.

## 2019-05-24 ENCOUNTER — APPOINTMENT (OUTPATIENT)
Dept: INTERVENTIONAL RADIOLOGY/VASCULAR | Facility: CLINIC | Age: 54
End: 2019-05-24
Attending: INTERNAL MEDICINE
Payer: COMMERCIAL

## 2019-05-24 PROBLEM — Z86.718 HISTORY OF DEEP VENOUS THROMBOSIS: Status: ACTIVE | Noted: 2019-05-24

## 2019-05-24 LAB
ALBUMIN SERPL-MCNC: 3.3 G/DL (ref 3.4–5)
ALP SERPL-CCNC: 64 U/L (ref 40–150)
ALT SERPL W P-5'-P-CCNC: 15 U/L (ref 0–70)
ANION GAP SERPL CALCULATED.3IONS-SCNC: 3 MMOL/L (ref 3–14)
APTT PPP: 68 SEC (ref 22–37)
AST SERPL W P-5'-P-CCNC: 8 U/L (ref 0–45)
BASOPHILS # BLD AUTO: 0 10E9/L (ref 0–0.2)
BASOPHILS NFR BLD AUTO: 0.2 %
BILIRUB SERPL-MCNC: 0.5 MG/DL (ref 0.2–1.3)
BUN SERPL-MCNC: 12 MG/DL (ref 7–30)
CALCIUM SERPL-MCNC: 9.2 MG/DL (ref 8.5–10.1)
CHLORIDE SERPL-SCNC: 111 MMOL/L (ref 94–109)
CO2 SERPL-SCNC: 27 MMOL/L (ref 20–32)
CREAT SERPL-MCNC: 0.91 MG/DL (ref 0.66–1.25)
DIFFERENTIAL METHOD BLD: NORMAL
EOSINOPHIL # BLD AUTO: 0.1 10E9/L (ref 0–0.7)
EOSINOPHIL NFR BLD AUTO: 1.3 %
ERYTHROCYTE [DISTWIDTH] IN BLOOD BY AUTOMATED COUNT: 13.9 % (ref 10–15)
GFR SERPL CREATININE-BSD FRML MDRD: >90 ML/MIN/{1.73_M2}
GLUCOSE SERPL-MCNC: 93 MG/DL (ref 70–99)
HCT VFR BLD AUTO: 40.6 % (ref 40–53)
HGB BLD-MCNC: 13.8 G/DL (ref 13.3–17.7)
IMM GRANULOCYTES # BLD: 0 10E9/L (ref 0–0.4)
IMM GRANULOCYTES NFR BLD: 0.2 %
INR PPP: 0.99 (ref 0.86–1.14)
INTERPRETATION ECG - MUSE: NORMAL
LMWH PPP CHRO-ACNC: 0.7 IU/ML
LMWH PPP CHRO-ACNC: 0.82 IU/ML
LYMPHOCYTES # BLD AUTO: 1.2 10E9/L (ref 0.8–5.3)
LYMPHOCYTES NFR BLD AUTO: 19.8 %
MCH RBC QN AUTO: 30.3 PG (ref 26.5–33)
MCHC RBC AUTO-ENTMCNC: 34 G/DL (ref 31.5–36.5)
MCV RBC AUTO: 89 FL (ref 78–100)
MONOCYTES # BLD AUTO: 0.6 10E9/L (ref 0–1.3)
MONOCYTES NFR BLD AUTO: 9.4 %
NEUTROPHILS # BLD AUTO: 4.1 10E9/L (ref 1.6–8.3)
NEUTROPHILS NFR BLD AUTO: 69.1 %
NRBC # BLD AUTO: 0 10*3/UL
NRBC BLD AUTO-RTO: 0 /100
PLATELET # BLD AUTO: 163 10E9/L (ref 150–450)
POTASSIUM SERPL-SCNC: 4 MMOL/L (ref 3.4–5.3)
PROT SERPL-MCNC: 6.5 G/DL (ref 6.8–8.8)
RBC # BLD AUTO: 4.56 10E12/L (ref 4.4–5.9)
SODIUM SERPL-SCNC: 141 MMOL/L (ref 133–144)
WBC # BLD AUTO: 6 10E9/L (ref 4–11)

## 2019-05-24 PROCEDURE — 27210742 ZZH CATH CR1

## 2019-05-24 PROCEDURE — 36415 COLL VENOUS BLD VENIPUNCTURE: CPT | Performed by: INTERNAL MEDICINE

## 2019-05-24 PROCEDURE — 85520 HEPARIN ASSAY: CPT | Performed by: INTERNAL MEDICINE

## 2019-05-24 PROCEDURE — 25800030 ZZH RX IP 258 OP 636: Performed by: RADIOLOGY

## 2019-05-24 PROCEDURE — 25000132 ZZH RX MED GY IP 250 OP 250 PS 637: Performed by: INTERNAL MEDICINE

## 2019-05-24 PROCEDURE — 80053 COMPREHEN METABOLIC PANEL: CPT | Performed by: INTERNAL MEDICINE

## 2019-05-24 PROCEDURE — 85730 THROMBOPLASTIN TIME PARTIAL: CPT | Performed by: INTERNAL MEDICINE

## 2019-05-24 PROCEDURE — 25500064 ZZH RX 255 OP 636: Performed by: RADIOLOGY

## 2019-05-24 PROCEDURE — 12000000 ZZH R&B MED SURG/OB

## 2019-05-24 PROCEDURE — 27210845 ZZH DEVICE INFLATION CR5

## 2019-05-24 PROCEDURE — 99223 1ST HOSP IP/OBS HIGH 75: CPT | Performed by: INTERNAL MEDICINE

## 2019-05-24 PROCEDURE — 25000128 H RX IP 250 OP 636: Performed by: RADIOLOGY

## 2019-05-24 PROCEDURE — 25800030 ZZH RX IP 258 OP 636: Performed by: INTERNAL MEDICINE

## 2019-05-24 PROCEDURE — 25000125 ZZHC RX 250

## 2019-05-24 PROCEDURE — 85610 PROTHROMBIN TIME: CPT | Performed by: INTERNAL MEDICINE

## 2019-05-24 PROCEDURE — 85347 COAGULATION TIME ACTIVATED: CPT

## 2019-05-24 PROCEDURE — 25000128 H RX IP 250 OP 636: Performed by: INTERNAL MEDICINE

## 2019-05-24 PROCEDURE — C1769 GUIDE WIRE: HCPCS

## 2019-05-24 PROCEDURE — 75820 VEIN X-RAY ARM/LEG: CPT | Mod: RT

## 2019-05-24 PROCEDURE — 27210804 ZZH SHEATH CR3

## 2019-05-24 PROCEDURE — 25000128 H RX IP 250 OP 636

## 2019-05-24 PROCEDURE — 85025 COMPLETE CBC W/AUTO DIFF WBC: CPT | Performed by: INTERNAL MEDICINE

## 2019-05-24 PROCEDURE — C1725 CATH, TRANSLUMIN NON-LASER: HCPCS

## 2019-05-24 PROCEDURE — 27210886 ZZH ACCESSORY CR5

## 2019-05-24 PROCEDURE — 99232 SBSQ HOSP IP/OBS MODERATE 35: CPT | Performed by: INTERNAL MEDICINE

## 2019-05-24 PROCEDURE — 27210906 ZZH KIT CR8

## 2019-05-24 RX ORDER — FLUMAZENIL 0.1 MG/ML
0.2 INJECTION, SOLUTION INTRAVENOUS
Status: DISCONTINUED | OUTPATIENT
Start: 2019-05-24 | End: 2019-05-24

## 2019-05-24 RX ORDER — NALOXONE HYDROCHLORIDE 0.4 MG/ML
.1-.4 INJECTION, SOLUTION INTRAMUSCULAR; INTRAVENOUS; SUBCUTANEOUS
Status: DISCONTINUED | OUTPATIENT
Start: 2019-05-24 | End: 2019-05-24

## 2019-05-24 RX ORDER — LIDOCAINE 40 MG/G
CREAM TOPICAL
Status: DISCONTINUED | OUTPATIENT
Start: 2019-05-24 | End: 2019-05-25 | Stop reason: HOSPADM

## 2019-05-24 RX ORDER — FENTANYL CITRATE 50 UG/ML
INJECTION, SOLUTION INTRAMUSCULAR; INTRAVENOUS
Status: COMPLETED
Start: 2019-05-24 | End: 2019-05-24

## 2019-05-24 RX ORDER — DEXTROSE MONOHYDRATE 25 G/50ML
25-50 INJECTION, SOLUTION INTRAVENOUS
Status: DISCONTINUED | OUTPATIENT
Start: 2019-05-24 | End: 2019-05-24

## 2019-05-24 RX ORDER — ACETAMINOPHEN 500 MG
500 TABLET ORAL EVERY 6 HOURS PRN
Status: DISCONTINUED | OUTPATIENT
Start: 2019-05-24 | End: 2019-05-25 | Stop reason: HOSPADM

## 2019-05-24 RX ORDER — NICOTINE POLACRILEX 4 MG
15-30 LOZENGE BUCCAL
Status: DISCONTINUED | OUTPATIENT
Start: 2019-05-24 | End: 2019-05-24

## 2019-05-24 RX ORDER — IOPAMIDOL 612 MG/ML
150 INJECTION, SOLUTION INTRAVASCULAR ONCE
Status: DISCONTINUED | OUTPATIENT
Start: 2019-05-24 | End: 2019-05-24

## 2019-05-24 RX ORDER — FENTANYL CITRATE 50 UG/ML
25-50 INJECTION, SOLUTION INTRAMUSCULAR; INTRAVENOUS EVERY 5 MIN PRN
Status: DISCONTINUED | OUTPATIENT
Start: 2019-05-24 | End: 2019-05-24

## 2019-05-24 RX ORDER — ACETAMINOPHEN 500 MG
500 TABLET ORAL EVERY 6 HOURS PRN
Status: DISCONTINUED | OUTPATIENT
Start: 2019-05-24 | End: 2019-05-24 | Stop reason: DRUGHIGH

## 2019-05-24 RX ORDER — HEPARIN SODIUM 1000 [USP'U]/ML
500-6000 INJECTION, SOLUTION INTRAVENOUS; SUBCUTANEOUS
Status: DISCONTINUED | OUTPATIENT
Start: 2019-05-24 | End: 2019-05-24

## 2019-05-24 RX ORDER — SODIUM CHLORIDE 9 MG/ML
INJECTION, SOLUTION INTRAVENOUS CONTINUOUS
Status: DISCONTINUED | OUTPATIENT
Start: 2019-05-24 | End: 2019-05-25 | Stop reason: HOSPADM

## 2019-05-24 RX ORDER — HEPARIN SODIUM 1000 [USP'U]/ML
INJECTION, SOLUTION INTRAVENOUS; SUBCUTANEOUS
Status: COMPLETED
Start: 2019-05-24 | End: 2019-05-24

## 2019-05-24 RX ORDER — LIDOCAINE HYDROCHLORIDE 10 MG/ML
INJECTION, SOLUTION INFILTRATION; PERINEURAL
Status: COMPLETED
Start: 2019-05-24 | End: 2019-05-24

## 2019-05-24 RX ORDER — NICOTINE POLACRILEX 4 MG
15-30 LOZENGE BUCCAL
Status: DISCONTINUED | OUTPATIENT
Start: 2019-05-24 | End: 2019-05-25 | Stop reason: HOSPADM

## 2019-05-24 RX ORDER — DEXTROSE MONOHYDRATE 25 G/50ML
25-50 INJECTION, SOLUTION INTRAVENOUS
Status: DISCONTINUED | OUTPATIENT
Start: 2019-05-24 | End: 2019-05-25 | Stop reason: HOSPADM

## 2019-05-24 RX ADMIN — MIDAZOLAM HYDROCHLORIDE 1 MG: 1 INJECTION, SOLUTION INTRAMUSCULAR; INTRAVENOUS at 13:45

## 2019-05-24 RX ADMIN — HEPARIN SODIUM 10000 UNITS: 10000 INJECTION INTRAVENOUS; SUBCUTANEOUS at 14:17

## 2019-05-24 RX ADMIN — HEPARIN SODIUM 10000 UNITS: 10000 INJECTION INTRAVENOUS; SUBCUTANEOUS at 13:25

## 2019-05-24 RX ADMIN — FENTANYL CITRATE 25 MCG: 50 INJECTION, SOLUTION INTRAMUSCULAR; INTRAVENOUS at 15:03

## 2019-05-24 RX ADMIN — FENTANYL CITRATE 50 MCG: 50 INJECTION, SOLUTION INTRAMUSCULAR; INTRAVENOUS at 14:15

## 2019-05-24 RX ADMIN — HEPARIN SODIUM 4000 UNITS: 1000 INJECTION INTRAVENOUS; SUBCUTANEOUS at 14:29

## 2019-05-24 RX ADMIN — MIDAZOLAM HYDROCHLORIDE 0.5 MG: 1 INJECTION, SOLUTION INTRAMUSCULAR; INTRAVENOUS at 14:54

## 2019-05-24 RX ADMIN — FENTANYL CITRATE 50 MCG: 50 INJECTION, SOLUTION INTRAMUSCULAR; INTRAVENOUS at 13:46

## 2019-05-24 RX ADMIN — LIDOCAINE HYDROCHLORIDE 5 ML: 10 INJECTION, SOLUTION INFILTRATION; PERINEURAL at 15:28

## 2019-05-24 RX ADMIN — FENTANYL CITRATE 25 MCG: 50 INJECTION, SOLUTION INTRAMUSCULAR; INTRAVENOUS at 14:54

## 2019-05-24 RX ADMIN — FENTANYL CITRATE 25 MCG: 50 INJECTION, SOLUTION INTRAMUSCULAR; INTRAVENOUS at 14:44

## 2019-05-24 RX ADMIN — HEPARIN SODIUM 4000 UNITS: 1000 INJECTION INTRAVENOUS; SUBCUTANEOUS at 14:38

## 2019-05-24 RX ADMIN — HEPARIN SODIUM 1500 UNITS/HR: 10000 INJECTION, SOLUTION INTRAVENOUS at 05:24

## 2019-05-24 RX ADMIN — FENTANYL CITRATE 50 MCG: 50 INJECTION, SOLUTION INTRAMUSCULAR; INTRAVENOUS at 13:39

## 2019-05-24 RX ADMIN — FENTANYL CITRATE 25 MCG: 50 INJECTION, SOLUTION INTRAMUSCULAR; INTRAVENOUS at 14:03

## 2019-05-24 RX ADMIN — MIDAZOLAM HYDROCHLORIDE 1 MG: 1 INJECTION, SOLUTION INTRAMUSCULAR; INTRAVENOUS at 13:39

## 2019-05-24 RX ADMIN — SIMVASTATIN 20 MG: 20 TABLET, FILM COATED ORAL at 21:22

## 2019-05-24 RX ADMIN — FENTANYL CITRATE 25 MCG: 50 INJECTION, SOLUTION INTRAMUSCULAR; INTRAVENOUS at 14:30

## 2019-05-24 RX ADMIN — MIDAZOLAM HYDROCHLORIDE 0.5 MG: 1 INJECTION, SOLUTION INTRAMUSCULAR; INTRAVENOUS at 14:02

## 2019-05-24 RX ADMIN — ASPIRIN 81 MG 81 MG: 81 TABLET ORAL at 08:09

## 2019-05-24 RX ADMIN — HEPARIN SODIUM 4000 UNITS: 1000 INJECTION, SOLUTION INTRAVENOUS; SUBCUTANEOUS at 14:29

## 2019-05-24 RX ADMIN — MIDAZOLAM HYDROCHLORIDE 0.5 MG: 1 INJECTION, SOLUTION INTRAMUSCULAR; INTRAVENOUS at 14:43

## 2019-05-24 RX ADMIN — IOPAMIDOL 60 ML: 612 INJECTION, SOLUTION INTRAVENOUS at 15:30

## 2019-05-24 RX ADMIN — SODIUM CHLORIDE: 9 INJECTION, SOLUTION INTRAVENOUS at 00:22

## 2019-05-24 RX ADMIN — MIDAZOLAM HYDROCHLORIDE 0.5 MG: 1 INJECTION, SOLUTION INTRAMUSCULAR; INTRAVENOUS at 14:29

## 2019-05-24 RX ADMIN — LISINOPRIL 10 MG: 10 TABLET ORAL at 08:09

## 2019-05-24 RX ADMIN — SODIUM CHLORIDE: 9 INJECTION, SOLUTION INTRAVENOUS at 10:11

## 2019-05-24 RX ADMIN — FENTANYL CITRATE 25 MCG: 50 INJECTION, SOLUTION INTRAMUSCULAR; INTRAVENOUS at 15:15

## 2019-05-24 RX ADMIN — MIDAZOLAM HYDROCHLORIDE 1 MG: 1 INJECTION, SOLUTION INTRAMUSCULAR; INTRAVENOUS at 14:14

## 2019-05-24 RX ADMIN — MIDAZOLAM HYDROCHLORIDE 0.5 MG: 1 INJECTION, SOLUTION INTRAMUSCULAR; INTRAVENOUS at 15:15

## 2019-05-24 RX ADMIN — MIDAZOLAM HYDROCHLORIDE 0.5 MG: 1 INJECTION, SOLUTION INTRAMUSCULAR; INTRAVENOUS at 15:03

## 2019-05-24 ASSESSMENT — ACTIVITIES OF DAILY LIVING (ADL)
ADLS_ACUITY_SCORE: 11

## 2019-05-24 NOTE — CONSULTS
[facility]    Vascular Medicine Consultation     Date of Admission:  5/23/2019  Date of Consult (When I saw the patient): 05/24/19         Physician Supervisory Attestation:   I have reviewed and discussed with the physician assistant their history, physical and plan and independently interviewed and examined Adelfo Rivera and agree with the plan as stated in the physician assistant note.      This is his second lifetime thromboembolic event. It may have been provoked by a fall followed by prolonged car travel. No signs of phlegmesia  BNP and trop normal.no respiratory symptoms.  Reviewed imaging studies and lab data.    He has been initiated on IV heparin and scheduled to undergo venogram with possible suction thrombectomy  Today.  Non smoker, no family Hx of hypercoagulable state.  Hx of HTN and HLP treated still LDL not at goal takes simva and niacin.    Follow post venogram protocol  Monitor access site  Serial Hgb  Restart IV heparin per protocol after the procedure   Initiate DOAC starting tomorrow if OK with patient.  Change simvastatin to crestor 20 daily  Consider discontinuing niacin  Compression stockings thigh high 20-30 mm Hg  He should follow up in the Vascular Health center in 3-4 weeks with Dr. Mckeon of Vascular Medicine.     Thank you for the consult   Vascular Medicine service will follow with you.    Copy to primary , Dr. Silva and Hospitalsit service.    Charles Mckeon MD, [facility]  Vascular Medicine   5/24/2019    Assessment & Plan   1. 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 It may have been provoked by a fall followed by prolonged car travel. He has been initiated on IV heparin and is awaiting a venogram with possible suction thrombectomy later today.     Although his DVTs appear to be provoked, given that this is his second lifetime event, and he will continue to  do long-distance car travel for work on a regular basis, he should be on anticoagulation indefinitely. A DOAC would be the most convenient for his lifestyle. Discussed with Pharmacy. They are unable to run the cost of Xarelto or Eliquis for him as [facility] is not a preferred pharmacy for him. They could do a coupon for a free first fill, but after that it is not clear how much it would cost him.      He has been utilizing calf-high compression stockings when driving on trips since his last DVT in 2011. However, given the extent of his current thrombus, he should utilize thigh-high compression stockings moving forward. A prescription will be provided for him.     He should follow up in the Vascular Health center in 3-4 weeks with Dr. Mckeon of Vascular Medicine.       Reason for Consult   Reason for consult: Asked by Dr. Silva (IR) to evaluate, assist with management and arrange follow-up for this 54 year old male presenting with his second lifetime thromboembolic event of extensive right lower extremity DVT extending from the left common femoral vein to the peroneal and posterior tibial veins in the calf.     Primary Care Physician   Gemma Infante      History of Present Illness   Adelfo Rivera is a 54 year old non-smoking male with a history of hyperlipidemia, hypertension, and osteoarthritis who had a left lower extremity DVT back in 2011. This was thought to be provoked by a long car ride and he completed 3 months of anticoagulation with warfarin. He states at that time he did undergo hypercoagulability work-up as well as a colonoscopy and PSA screen, all of which were unremarkable. Unfortunately, these records are not currently available to see. Since this time he has continued to utilize calf-high compression hosiery when traveling in his car.     In mid-April he fell, twisting his right knee. This resulted in some pain. He then went on a fishing trip which took 9 hours by car to get there. He  continued to have some pain and swelling. He was seen by Ortho, thinking it was his knee and underwent a steroid injection. However, this did not seem to help his pain and intermittent right leg swelling. He continued to travel by car for work and trips and then was seen by Orthopedics yesterday. A right lower extremity duplex was completed and this revealed an extensive right lower extremity DVT. He was sent to the ER at [facility] and then transferred to [facility] for thrombectomy in Interventional Radiology.     Past Medical History   Past Medical History:   Diagnosis Date     Benign essential hypertension 5/24/2018     DVT (deep venous thrombosis) (H) 5/23/2019     Hyperlipidemia LDL goal <100 5/24/2018       Past Surgical History   History reviewed. No pertinent surgical history.    Prior to Admission Medications   Prior to Admission Medications   Prescriptions Last Dose Informant Patient Reported? Taking?   BABY ASPIRIN PO 5/23/2019 at AM  Yes Yes   Sig: Take 81 mg by mouth daily    Fish Oil-Cholecalciferol (FISH OIL + D3 PO) 5/23/2019 at Unknown time  Yes Yes   Sig: Take 1 capsule by mouth daily    NIACIN-50 PO 5/23/2019 at AM  Yes Yes   Sig: Take 1 tablet by mouth daily    TURMERIC PO 5/23/2019 at AM  Yes Yes   Sig: Take 1 tablet by mouth daily    lisinopril (PRINIVIL/ZESTRIL) 10 MG tablet 5/23/2019 at AM  No Yes   Sig: Take 1 tablet (10 mg) by mouth daily   simvastatin (ZOCOR) 20 MG tablet 5/23/2019 at AM  No Yes   Sig: Take 1 tablet (20 mg) by mouth At Bedtime      Facility-Administered Medications: None     Allergies   No Known Allergies    Social History   Adelfo Rivera  reports that he has never smoked. He has never used smokeless tobacco. He reports that he drinks alcohol. He reports that he does not use drugs. He travels by car for long distances for work often.     Family History   Family History   Problem Relation Age of Onset     No Known Problems Mother      No Known Problems Father    No history  of DVT or clotting disorders that he knows of.     Review of Systems   The 10 point Review of Systems is negative other than noted in the HPI or here.     Physical Exam   Temp: 98.5  F (36.9  C) Temp src: Oral BP: 109/76   Heart Rate: 80 Resp: 16 SpO2: 95 % O2 Device: None (Room air)    Vital Signs with Ranges  Temp:  [97.3  F (36.3  C)-98.5  F (36.9  C)] 98.5  F (36.9  C)  Pulse:  [] 93  Heart Rate:  [80-95] 80  Resp:  [14-18] 16  BP: (109-133)/(76-97) 109/76  SpO2:  [94 %-98 %] 95 %  227 lbs 11.76 oz    Constitutional: awake, alert, cooperative, no apparent distress, and appears stated age  Eyes: Lids and lashes normal, pupils equal, round and reactive to light, extra ocular muscles intact, sclera clear, conjunctiva normal  ENT: normocepalic, without obvious abnormality, oropharynx pink and moist  Hematologic / Lymphatic: no lymphadenopathy  Respiratory: No increased work of breathing, good air exchange, clear to auscultation bilaterally, no crackles or wheezing  Cardiovascular: regular rate and rhythm, normal S1 and S2 and no murmur noted  GI: Normal bowel sounds, soft, non-distended, non-tender  Skin: no redness, warmth, or swelling, no rashes and no lesions. Some spider veins notes around ankles.   Musculoskeletal: There is no redness, warmth, or swelling of the joints.  Full range of motion noted.  Motor strength is 5 out of 5 all extremities bilaterally.  Tone is normal. Minimal RLE swelling.   Neurologic: Awake, alert, oriented to name, place and time.  Cranial nerves II-XII are grossly intact.  Motor is 5 out of 5 bilaterally.    Neuropsychiatric:  Normal affect, memory, insight.  Pulses: Palpable pedal pulses. No carotid bruits appreciated.     Data   Most Recent 3 CBC's:  Recent Labs   Lab Test 05/24/19  0810 05/23/19 2008 05/23/19  1605   WBC 6.0 5.9 6.6   HGB 13.8 14.2 15.0   MCV 89 89 90    180 204     Most Recent 3 BMP's:  Recent Labs   Lab Test 05/24/19  0810 04/18/19  0821  05/24/18  1005    139 143   POTASSIUM 4.0 4.2 4.9   CHLORIDE 111* 106 108   CO2 27 24 26   BUN 12 12 16   CR 0.91 0.87 1.09   ANIONGAP 3 9 9   BONNY 9.2 9.7 9.5   GLC 93 91 97     Most Recent 3 INR's:  Recent Labs   Lab Test 05/24/19  0810 05/23/19  1605   INR 0.99 0.97     Most Recent Cholesterol Panel:  Recent Labs   Lab Test 04/18/19  0821   CHOL 232*   *   HDL 55   TRIG 141     Most Recent Hemoglobin A1c:  Recent Labs   Lab Test 04/18/19  0821   A1C 5.0

## 2019-05-24 NOTE — PHARMACY-ADMISSION MEDICATION HISTORY
Admission Medication History    Admission medication history interview status for the 5/23/2019 admission is complete. See EPIC admission navigator for prior to admission medications     Medication history source reliability: Good but patient using OTC supplements and unfamilar with dosage strengths    Actions taken by pharmacist (provider contacted, etc):None     Additional medication history information not noted on PTA med list :None    Medication reconciliation/reorder completed by provider prior to medication history? Yes    Time spent in this activity: 15 minutes    Prior to Admission medications    Medication Sig Last Dose Taking? Auth Provider   BABY ASPIRIN PO Take 81 mg by mouth daily  5/23/2019 at AM Yes Reported, Patient   Fish Oil-Cholecalciferol (FISH OIL + D3 PO) Take 1 capsule by mouth daily  5/23/2019 at Unknown time Yes Reported, Patient   lisinopril (PRINIVIL/ZESTRIL) 10 MG tablet Take 1 tablet (10 mg) by mouth daily 5/23/2019 at AM Yes Gemma Infante MD   NIACIN-50 PO Take 1 tablet by mouth daily  5/23/2019 at AM Yes Reported, Patient   simvastatin (ZOCOR) 20 MG tablet Take 1 tablet (20 mg) by mouth At Bedtime 5/23/2019 at AM Yes Gemma Infante MD   TURMERIC PO Take 1 tablet by mouth daily  5/23/2019 at AM Yes Reported, Patient       Federico Martinez, SalvatoreD

## 2019-05-24 NOTE — H&P
[facility]    History and Physical  Hospitalist       Date of Admission:  5/23/2019    Assessment & Plan      This is a 54-year-old male with history of hypertension, hyperlipidemia, history of DVT in 2011 to the left lower extremity, who was transferred from [facility] for extensive right lower extremity DVT.        ASSESSMENT AND PLAN:   1.  Extensive right lower extremity deep venous thrombosis.  This is a 54-year-old male with extensive deep venous thrombosis of the right lower extremity with possibly no provoking symptoms.  I think it will be an unprovoked deep venous thrombosis.  This is the second episode.  At this time, he is on IV heparin and will continue with, weight-based protocol at this time.  Consult Interventional Radiology for thrombectomy as per their recommendation.  The leg is not extensively swollen or painful at this time.  Will let them evaluate and will do the thrombectomy as per their recommendation in the morning.  Interventional Radiology is consulted.   2.  Hypertension.  Blood pressure is in good control with lisinopril 10 mg daily.  We will continue with that.   3.  Hyperlipidemia, on Zocor.  I will continue with that.   4.  Deep venous thrombosis prophylaxis with intravenous heparin.      CODE STATUS:  Full code, as per the patient wishes.      The case was discussed with the ER physician and the nursing staff taking care of the patient.         CORY JOY MD      DVT Prophylaxis: Heparin IV  Code Status: Full Code    Disposition: Expected discharge in 2 days once stable.    Cory Joy MD    Primary Care Physician   Gemma Infante    Chief Complaint   Right leg swelling     History is obtained from the patient    History of Present Illness   Admitted:     05/23/2019      HISTORY OF PRESENT ILLNESS:  This is a 54-year-old male with history of hypertension, hyperlipidemia, history of DVT in 2011 to the left lower extremity, who was transferred from  [facility] for extensive right lower extremity DVT.      According to the patient, he has been having right lower leg pain and swelling for about 1 month after his travel to [place] of about a 5-hour drive.  Since then, he has been seen by the orthopedic and received a shot in his right knee for possible arthritis, but did not improve the swelling.      The patient went to see the orthopedic again because of the right leg swelling.  They asked him to do the ultrasound and the ultrasound shows extensive DVT extending throughout the visualized peroneal and posterior tibial veins in the right calf.  The patient was advised to go to the ER.      The patient was at [facility] and was started on IV heparin and IR was consulted and they recommended thrombectomy, so the patient was transferred to [facility] for further management.      At this time, the patient denies any chest pain, shortness of breath, orthopnea, PND, palpitation, headache, dizziness, lightheadedness.  No fever, chills or cough.  No abdominal pain, dysuria, hematuria, constipation, diarrhea.  The patient denies any hemoptysis, hematemesis, melena or hematochezia at this time.      When the patient had a DVT in the left lower extremity in 2011, he had extensive workup done for malignancy and for thrombosis as well, which were all negative.     Past Medical History    I have reviewed this patient's medical history and updated it with pertinent information if needed.   Past Medical History:   Diagnosis Date     Benign essential hypertension 5/24/2018     DVT (deep venous thrombosis) (H) 5/23/2019     Hyperlipidemia LDL goal <100 5/24/2018       Past Surgical History   I have reviewed this patient's surgical history and updated it with pertinent information if needed.  History reviewed. No pertinent surgical history.    Prior to Admission Medications   Prior to Admission Medications   Prescriptions Last Dose Informant Patient Reported?  Taking?   BABY ASPIRIN PO   Yes No   Sig: Take by mouth daily   Fish Oil-Cholecalciferol (FISH OIL + D3 PO)   Yes No   NIACIN-50 PO   Yes No   Sig: Take by mouth daily   TURMERIC PO   Yes No   Sig: Take by mouth daily   lisinopril (PRINIVIL/ZESTRIL) 10 MG tablet   No No   Sig: Take 1 tablet (10 mg) by mouth daily   piroxicam (FELDENE) 20 MG capsule   Yes No   Sig: TAKE 1 CAPSULE BY MOUTH EVERY DAY WITH FOOD   simvastatin (ZOCOR) 10 MG tablet   No No   Sig: Take 1 tablet (10 mg) by mouth daily   simvastatin (ZOCOR) 20 MG tablet   No No   Sig: Take 1 tablet (20 mg) by mouth At Bedtime      Facility-Administered Medications: None     Allergies   No Known Allergies    Social History   I have reviewed this patient's social history and updated it with pertinent information if needed. Adelfo Rivera  reports that he has never smoked. He has never used smokeless tobacco. He reports that he drinks alcohol. He reports that he does not use drugs.    Family History   I have reviewed this patient's family history and updated it with pertinent information if needed.   Family History   Problem Relation Age of Onset     No Known Problems Mother      No Known Problems Father        Review of Systems   CONSTITUTIONAL:  negative  EYES:  negative  HEENT:  negative  RESPIRATORY:  negative  CARDIOVASCULAR:  negative  GASTROINTESTINAL:  negative  GENITOURINARY:  negative  INTEGUMENT/BREAST:  negative  HEMATOLOGIC/LYMPHATIC:  negative  ALLERGIC/IMMUNOLOGIC:  negative  ENDOCRINE:  negative  MUSCULOSKELETAL:  positive for  Right leg swelling   NEUROLOGICAL:  negative  BEHAVIOR/PSYCH:  negative    Physical Exam   Temp: 98.2  F (36.8  C) Temp src: Oral BP: (!) 132/91   Heart Rate: 92 Resp: 14 SpO2: 98 % O2 Device: None (Room air)    Vital Signs with Ranges  Temp:  [97.3  F (36.3  C)-98.2  F (36.8  C)] 98.2  F (36.8  C)  Pulse:  [] 93  Heart Rate:  [90-95] 92  Resp:  [14-18] 14  BP: (127-133)/(90-97) 132/91  SpO2:  [96 %-98 %] 98 %  0  lbs 0 oz    Constitutional: Awake, alert, cooperative, no apparent distress.  Eyes: Conjunctiva and pupils examined and normal.  HEENT: Moist mucous membranes, normal dentition.  Respiratory: Clear to auscultation bilaterally, no crackles or wheezing.  Cardiovascular: Regular rate and rhythm, normal S1 and S2, and no murmur noted.  GI: Soft, non-distended, non-tender, normal bowel sounds.  Lymph/Hematologic: No anterior cervical or supraclavicular adenopathy.  Skin: No rashes, no cyanosis, right lower extremity edema 1+  Musculoskeletal: mild erythema, swelling and mild tenderness to right LE  Neurologic: Cranial nerves 2-12 intact, normal strength and sensation.  Psychiatric: Alert, oriented to person, place and time, no obvious anxiety or depression.    Data   Data reviewed today:  I personally reviewed no images or EKG's today.  Recent Labs   Lab 05/23/19  1605   WBC 6.6   HGB 15.0   MCV 90      INR 0.97   TROPI <0.015       Recent Results (from the past 24 hour(s))   US Lower Extremity Venous Duplex Right    Narrative    ULTRASOUND LOWER EXTREMITY VENOUS DUPLEX RIGHT 5/23/2019 2:21 PM     HISTORY: Deep vein thrombosis (DVT) of right lower extremity,  unspecified chronicity, unspecified vein (H). Right calf pain and  swelling.    TECHNIQUE: Venous Doppler US of the lower extremity.?Color flow and  spectral Doppler with waveform analysis performed.    COMPARISON: None.    FINDINGS: Ultrasound of the right leg demonstrates extensive,  occlusive deep vein thrombosis from the common femoral vein through  the femoral and popliteal veins and extending throughout the  visualized peroneal and posterior tibial veins in the right calf.      Impression    IMPRESSION: Extensive deep vein thrombus throughout the veins of the  right leg.    Findings called to Terence Palacios by the sonographer at 1410 hours.   Patient was instructed by them to go to the Emergency Room.    CATHY MUHAMMAD MD

## 2019-05-24 NOTE — PLAN OF CARE
DATE & TIME: 5/23 Night  ORIENTATION: Alert and oriented   BEHAVIOR & AGGRESSION TOOL COLOR: Green  ABNL VS/O2: VSS room air  MOBILITY: Independent   DIET: NPO  BOWEL/BLADDER: Continent  ABNL LAB/BG:   DRAIN/DEVICES: PIV NS at 100/hr, Heparin gtt at 1500 units/hr  SKIN: + 1-2 edema RLE, slightly pink/red  TESTS/PROCEDURES: To IR today  D/C DAY/GOALS/PLACE: pending  OTHER IMPORTANT INFO: Lung sounds clear, denies numbness/tingling

## 2019-05-24 NOTE — PROCEDURES
RADIOLOGY POST PROCEDURE NOTE w/ SEDATION  Patient name: Adelfo Rivera  MRN: [id number]  : 1965    Pre-procedure diagnosis: extensive right lower extremity DVT  Post-procedure diagnosis: Same    Procedure Date/Time: May 24, 2019  3:33 PM  Procedure: right lower extremity venogram with mechanical thrombectomy.   Estimated blood loss: 50 mL  Specimen(s) collected with description: venous thrombus.     I determined this patient to be an appropriate candidate for the planned sedation and procedure and reassessed the patient IMMEDIATELY PRIOR to sedation and procedure.     The patient tolerated the procedure well with no immediate complications.  Significant findings:none    See imaging dictation for procedural details.    Provider name: Radha Silva  Assistant(s):None

## 2019-05-24 NOTE — PROVIDER NOTIFICATION
Pagekishan Patricio about current Heparin drip orders and asked for a diet post IR procedure.     Reg diet ordered.     Will cont current IV Heparin drip at current rate of 1500u/hr until 0800 5/25. Pt will start Apixaban in the AM after drip is discontinued.     No further 10a labs will be needed in the AM  d/t the stop of IV Heparin drip and start of oral anticoagulants.

## 2019-05-24 NOTE — IR NOTE
Interventional Radiology Intra-procedural Nursing Note    Patient Name: Adelfo Rivera  Medical Record Number: [id number]  Today's Date: May 24, 2019    Start Time: 1345  End of procedure time: 1521  Procedure: right lower extremity venogram, mechanical thrombectomy using Inari clot retriever, angioplasty  Report given to: [facility] 66 KENN Aguayo  Time pt departs:  1550  : n/a    Other Notes:     Patient arrives to Ir [address]. Patient identification verified and consent obtained by Dr. Silva. Patient was then assisted onto procedure table, positioned safely in prone position, and connected to monitoring equipment. Access site right posterior leg was prepped and patient draped under sterile technique.     Versed 6mg IV  Fentanyl 300mcg IV  Heparin 8,000 units IV (in total)     at 1433    Patient tolerated procedure well. VSS. Patient alert, respirations regular and unlabored, no c/o pain at this time.   Procedure access site posterior right leg is c/d/i, 14f venous sheath removed at 1521, manual pressure was applied until hemostasis achieved, sterile gauze dressing applied.  Patient transferred to station 66 in stable condition accompanied by transport.    Cindy Schumacher RN on 5/24/2019 at 3:54 PM

## 2019-05-24 NOTE — PLAN OF CARE
A&Ox4, pleasant. Up ind to BR. VSS. Denies pain. NPO for IR procedure. IVF's. Hep gtt at 1500 unit(s)/hr, recheck 5/25 AM. Right LE edema present, denies numbness/tingling. Wife at bedside. Belongings will be moved to [address] if needing to move after IR.

## 2019-05-24 NOTE — PLAN OF CARE
Admission    Patient arrives to room 623 via cart from [facility] ED.  Care plan note: DATE & TIME: 5/23/19 evening  ORIENTATION: A/Ox4  BEHAVIOR & AGGRESSION TOOL COLOR: Green  CIWA SCORE: NA  ABNL VS/O2: VSS on RA  MOBILITY: IND  PAIN MANAGMENT: Denies  DIET: Regular NPO at midnight  BOWEL/BLADDER: Continent  ABNL LAB/BG: NA  DRAIN/DEVICES: PIV Heparin infusing, next Hep10a @ 2345.  TELEMETRY RHYTHM: NA  SKIN: Intact, slight pink RLE, 1-2+ edema RLE, bruising  TESTS/PROCEDURES: IR consulted possible mechanical thrombectomy tomorrow  D/C DAY/GOALS/PLACE: pending  OTHER IMPORTANT INFO:     Inpatient nursing criteria listed below were met:    PCD's Documented: NA  Skin issues/needs documented :Yes  Isolation education started/completed NA  Patient allergies verified with patient: NA  Verified completion of [place] Risk Assessment Tool:  yes  Verified completion of Guardianship screening tool: Yes  Fall Prevention: Care plan updated, Education given and documented NA  Care Plan initiated: Yes  Home medications documented in belongings flowsheet: NA  Patient belongings documented in belongings flowsheet: Yes  Reminder note (belongings/ medications) placed in discharge instructions:NA  Admission profile/ required documentation complete: Yes  Bedside Report Letter given and explained to patient No

## 2019-05-24 NOTE — PROGRESS NOTES
[facility]    Hospitalist Progress Note    Assessment & Plan   Adelfo Rivera is a 54 year old male who was admitted on 5/23/2019 with right leg pain and slight swelling for 3-4 weeks, started after a 5 hour car ride, and did see an orthopedist who injected his right knee with steroids 3 weeks ago:    Impression:   Principal Problem:    Proximal DVT Right leg -- suspect provoked by prolonged car ride   -- currently on IV heparin   -- IR to see today for attempt at Thrombectomy right leg   -- anticipate long term anticoagulation (second event)    Active Problems:    Hyperlipidemia LDL goal <100      Benign essential hypertension      History of Left Leg DVT 2011   -- treated with Warfarin for about 6 months then (negative hypercoag work up then)   -- had normal colonoscopy after that episode, and no weight loss now to suggest cancer      Plan:  As above    DVT Prophylaxis: IV heparin  Code Status: Full Code    Disposition: Expected discharge tomorrow    Haseeb Del Valle MD  Pager [phone]  Cell Phone [phone]  Text Page (7am to 6pm)    Interval History   Feels OK, slight discomfort right leg behind knee. Prior to admission he noticed very slight right leg swelling, and slight pain right leg with walking.     Physical Exam   Temp: 98.5  F (36.9  C) Temp src: Oral BP: 109/76   Heart Rate: 80 Resp: 16 SpO2: 95 % O2 Device: None (Room air)    Vitals:    05/24/19 0521   Weight: 103.3 kg (227 lb 11.8 oz)     Vital Signs with Ranges  Temp:  [97.3  F (36.3  C)-98.5  F (36.9  C)] 98.5  F (36.9  C)  Pulse:  [] 93  Heart Rate:  [80-95] 80  Resp:  [14-18] 16  BP: (109-133)/(76-97) 109/76  SpO2:  [94 %-98 %] 95 %  I/O last 3 completed shifts:  In: 751 [I.V.:751]  Out: -     # Pain Assessment:  Current Pain Score 5/24/2019   Patient currently in pain? denies   Pain score (0-10) -   Adelfo martin pain level was assessed and he currently denies pain.        Constitutional: Awake, alert,  cooperative, no apparent distress  Respiratory: Clear to auscultation bilaterally, no crackles or wheezing  Cardiovascular: Regular rate and rhythm, normal S1 and S2, and no murmur noted  GI: Normal bowel sounds, soft, non-distended, non-tender  Extrem: No calf tenderness, trace right and no left ankle edema.  Right leg looks normal (no erythema or cyanosis)   Neuro: Ox3, no focal motor or sensory deficits    Medications     HEParin 1,500 Units/hr (05/24/19 0940)     - MEDICATION INSTRUCTIONS -       - MEDICATION INSTRUCTIONS -       - MEDICATION INSTRUCTIONS -       sodium chloride 100 mL/hr at 05/24/19 1011       aspirin  81 mg Oral Daily     lisinopril  10 mg Oral Daily     simvastatin  20 mg Oral At Bedtime     sodium chloride (PF)  3 mL Intracatheter Q8H       Data   Recent Labs   Lab 05/24/19  0810 05/23/19 2008 05/23/19  1605   WBC 6.0 5.9 6.6   HGB 13.8 14.2 15.0   MCV 89 89 90    180 204   INR 0.99  --  0.97     --   --    POTASSIUM 4.0  --   --    CHLORIDE 111*  --   --    CO2 27  --   --    BUN 12  --   --    CR 0.91  --   --    ANIONGAP 3  --   --    BONNY 9.2  --   --    GLC 93  --   --    ALBUMIN 3.3*  --   --    PROTTOTAL 6.5*  --   --    BILITOTAL 0.5  --   --    ALKPHOS 64  --   --    ALT 15  --   --    AST 8  --   --    TROPI  --   --  <0.015       Imaging:   Recent Results (from the past 24 hour(s))   US Lower Extremity Venous Duplex Right    Narrative    ULTRASOUND LOWER EXTREMITY VENOUS DUPLEX RIGHT 5/23/2019 2:21 PM     HISTORY: Deep vein thrombosis (DVT) of right lower extremity,  unspecified chronicity, unspecified vein (H). Right calf pain and  swelling.    TECHNIQUE: Venous Doppler US of the lower extremity.?Color flow and  spectral Doppler with waveform analysis performed.    COMPARISON: None.    FINDINGS: Ultrasound of the right leg demonstrates extensive,  occlusive deep vein thrombosis from the common femoral vein through  the femoral and popliteal veins and extending  throughout the  visualized peroneal and posterior tibial veins in the right calf.      Impression    IMPRESSION: Extensive deep vein thrombus throughout the veins of the  right leg.    Findings called to Terence Palacios by the sonographer at 1410 hours.   Patient was instructed by them to go to the Emergency Room.    CATHY MUHAMMAD MD

## 2019-05-24 NOTE — H&P
Admitted:     05/23/2019      HISTORY OF PRESENT ILLNESS:  This is a 54-year-old male with history of hypertension, hyperlipidemia, history of DVT in 2011 to the left lower extremity, who was transferred from [facility] for extensive right lower extremity DVT.      According to the patient, he has been having right lower leg pain and swelling for about 1 month after his travel to [place] of about a 5-hour drive.  Since then, he has been seen by the orthopedic and received a shot in his right knee for possible arthritis, but did not improve the swelling.      The patient went to see the orthopedic again because of the right leg swelling.  They asked him to do the ultrasound and the ultrasound shows extensive DVT extending throughout the visualized peroneal and posterior tibial veins in the right calf.  The patient was advised to go to the ER.      The patient was at [facility] and was started on IV heparin and IR was consulted and they recommended thrombectomy, so the patient was transferred to [facility] for further management.      At this time, the patient denies any chest pain, shortness of breath, orthopnea, PND, palpitation, headache, dizziness, lightheadedness.  No fever, chills or cough.  No abdominal pain, dysuria, hematuria, constipation, diarrhea.  The patient denies any hemoptysis, hematemesis, melena or hematochezia at this time.      When the patient had a DVT in the left lower extremity in 2011, he had extensive workup done for malignancy and for thrombosis as well, which were all negative.      ASSESSMENT AND PLAN:   1.  Extensive right lower extremity deep venous thrombosis.  This is a 54-year-old male with extensive deep venous thrombosis of the right lower extremity with possibly no provoking symptoms.  I think it will be an unprovoked deep venous thrombosis.  This is the second episode.  At this time, he is on IV heparin and will continue with, weight-based protocol at this  time.  Consult Interventional Radiology for thrombectomy as per their recommendation.  The leg is not extensively swollen or painful at this time.  Will let them evaluate and will do the thrombectomy as per their recommendation in the morning.  Interventional Radiology is consulted.   2.  Hypertension.  Blood pressure is in good control with lisinopril 10 mg daily.  We will continue with that.   3.  Hyperlipidemia, on Zocor.  I will continue with that.   4.  Deep venous thrombosis prophylaxis with intravenous heparin.      CODE STATUS:  Full code, as per the patient wishes.      The case was discussed with the ER physician and the nursing staff taking care of the patient.         FRAN ALLEN MD             D: 2019   T: 2019   MT: FELICIA      Name:     JOHNNA ERVIN   MRN:      [id number]        Account:      [id number]   :      1965        Admitted:     2019                   Document: [id number]

## 2019-05-25 VITALS
TEMPERATURE: 98.2 F | HEART RATE: 86 BPM | OXYGEN SATURATION: 93 % | BODY MASS INDEX: 27.32 KG/M2 | DIASTOLIC BLOOD PRESSURE: 90 MMHG | RESPIRATION RATE: 16 BRPM | SYSTOLIC BLOOD PRESSURE: 131 MMHG | WEIGHT: 224.43 LBS

## 2019-05-25 PROCEDURE — 99233 SBSQ HOSP IP/OBS HIGH 50: CPT | Performed by: INTERNAL MEDICINE

## 2019-05-25 PROCEDURE — 25000132 ZZH RX MED GY IP 250 OP 250 PS 637: Performed by: INTERNAL MEDICINE

## 2019-05-25 PROCEDURE — 25000128 H RX IP 250 OP 636: Performed by: INTERNAL MEDICINE

## 2019-05-25 PROCEDURE — 99239 HOSP IP/OBS DSCHRG MGMT >30: CPT | Performed by: INTERNAL MEDICINE

## 2019-05-25 RX ORDER — ROSUVASTATIN CALCIUM 10 MG/1
10 TABLET, COATED ORAL DAILY
Qty: 30 TABLET | Refills: 1 | Status: SHIPPED | OUTPATIENT
Start: 2019-05-25 | End: 2019-07-27

## 2019-05-25 RX ORDER — ROSUVASTATIN CALCIUM 10 MG/1
10 TABLET, COATED ORAL DAILY
Status: DISCONTINUED | OUTPATIENT
Start: 2019-05-25 | End: 2019-05-25 | Stop reason: HOSPADM

## 2019-05-25 RX ORDER — ROSUVASTATIN CALCIUM 10 MG/1
10 TABLET, COATED ORAL DAILY
Qty: 30 TABLET | Refills: 1 | Status: SHIPPED | OUTPATIENT
Start: 2019-05-25 | End: 2019-05-25

## 2019-05-25 RX ADMIN — LISINOPRIL 10 MG: 10 TABLET ORAL at 08:59

## 2019-05-25 RX ADMIN — ROSUVASTATIN CALCIUM 10 MG: 10 TABLET, FILM COATED ORAL at 12:34

## 2019-05-25 RX ADMIN — HEPARIN SODIUM 1500 UNITS/HR: 10000 INJECTION, SOLUTION INTRAVENOUS at 01:24

## 2019-05-25 RX ADMIN — APIXABAN 10 MG: 5 TABLET, FILM COATED ORAL at 08:55

## 2019-05-25 RX ADMIN — ASPIRIN 81 MG 81 MG: 81 TABLET ORAL at 08:55

## 2019-05-25 ASSESSMENT — ACTIVITIES OF DAILY LIVING (ADL)
ADLS_ACUITY_SCORE: 11

## 2019-05-25 NOTE — PROGRESS NOTES
[facility]    Vascular Medicine Progress Note    Date of Service (when I saw the patient): 05/25/2019     Assessment & Plan   Adelfo Rivera is a 54 year old male who was admitted on 5/23/2019.      1. 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 It may have been provoked by a fall followed by prolonged car travel.     This is his second lifetime thromboembolic event. It may have been provoked by a fall followed by prolonged car travel. No signs of phlegmesia  BNP and trop normal.no respiratory symptoms.  Reviewed imaging studies and venogram underwent successful suction thrombectomy of proximal veins above the knee.     Restarted IV heparin after procedure and this was stopped this AM and received 10 mg of Apixaban this am.  BP is better .  Non smoker, no family Hx of hypercoagulable state.   LDL not at goal takes simva and niacin.  venous access site dressing in place one stitch was placed.     Plan:  check BMP and CBC now  Monitor access site  Apixaban 10 mg bid for one week then followed by 5 mg bid with food and continue on this dose.  ( educated pros and cons of apixaban with patient and wife today)  Stop simvastatin and start crestor 10 mg daily   Stop niacin  No aspirin while on apixaban  Use compression stockings thigh high 20-30 mm hg day time and elevate legs when ever possible.  [facility] in 3-4 weeks with Dr. Mckeon of Vascular Medicine. [phone] I have given my card  Follow up with primary in a week for suture removal on access site.       2. Hyperlipidemia and sub optimal with current simvastatin and niacin:  See above change simva to Crestor 10 mg daily and discharge on this dose.    3. HTN:  Continue lisinopril same dose for now and monitor BP at home goal is less than 130/80    Patient care time spent 35 minutes  today  Discussed with nursing staff    Copy to [facility] service, primary MD Charles Mckeon MD,[facility]  Vascular Medicine    Interval History     Doing well, off heparin and started apixaban 10 mg bid for a week this am  Leg looks good, well perfused.  BLE VV CEAP 4 CVI RT >left side.    Physical Exam   Temp: 98.2  F (36.8  C) Temp src: Oral BP: 131/90 Pulse: 86 Heart Rate: 95 Resp: 16 SpO2: 93 % O2 Device: None (Room air) Oxygen Delivery: 2 LPM  Vitals:    05/24/19 0521 05/25/19 0500   Weight: 103.3 kg (227 lb 11.8 oz) 101.8 kg (224 lb 6.9 oz)     Vital Signs with Ranges  Temp:  [97.7  F (36.5  C)-98.2  F (36.8  C)] 98.2  F (36.8  C)  Pulse:  [77-93] 86  Heart Rate:  [77-95] 95  Resp:  [10-22] 16  BP: ()/(62-93) 131/90  SpO2:  [93 %-97 %] 93 %  No intake/output data recorded.    Constitutional: Awake, alert, cooperative, no apparent distress, and appears stated age.  Eyes: Lids and lashes normal, pupils equal, round and reactive to light, extra ocular muscles intact, sclera clear, conjunctiva normal.  ENT: Normocephalic, without obvious abnormality  Respiratory: No increased work of breathing, good air exchange, clear to auscultation bilaterally, no crackles or wheezing.  Cardiovascular: Normal apical impulse, regular rate and rhythm, normal S1 and S2, no S3 or S4, and no murmur noted.  GI:  normal bowel sounds, soft, non-distended, non-tender, no masses palpated, no hepatosplenomegaly.  Lymph/Hematologic: No cervical lymphadenopathy and no supraclavicular lymphadenopathy.  Genitourinary:  negative  Skin: No bruising or bleeding, normal skin color, texture, turgor, no redness, warmth, or swelling, no rashes, no lesions,  nails normal without discoloration or clubbing and no jaundice.  Musculoskeletal: no edema . Access site dressing in place.  VV BLE CEAP 4 CVI RT>left side.    Neurologic: Awake, alert, oriented to name, place and time.  Cranial nerves II-XII are grossly intact.  Motor is 5  out of 5 bilaterally.   Neuropsychiatric: Calm, normal eye contact, alert, normal affect, oriented to self, place, time and situation, memory for past and recent events intact and thought process normal.  Pulses: good palpable pulses bilateral.    Medications     - MEDICATION INSTRUCTIONS -       - MEDICATION INSTRUCTIONS -       - MEDICATION INSTRUCTIONS -       sodium chloride 100 mL/hr at 05/24/19 1011       apixaban ANTICOAGULANT  10 mg Oral BID     aspirin  81 mg Oral Daily     lisinopril  10 mg Oral Daily     simvastatin  20 mg Oral At Bedtime     sodium chloride (PF)  3 mL Intracatheter Q8H       Data   Recent Labs   Lab 05/24/19  0810 05/23/19 2008 05/23/19  1605   WBC 6.0 5.9 6.6   HGB 13.8 14.2 15.0   MCV 89 89 90    180 204   INR 0.99  --  0.97     --   --    POTASSIUM 4.0  --   --    CHLORIDE 111*  --   --    CO2 27  --   --    BUN 12  --   --    CR 0.91  --   --    ANIONGAP 3  --   --    BONNY 9.2  --   --    GLC 93  --   --    ALBUMIN 3.3*  --   --    PROTTOTAL 6.5*  --   --    BILITOTAL 0.5  --   --    ALKPHOS 64  --   --    ALT 15  --   --    AST 8  --   --    TROPI  --   --  <0.015

## 2019-05-25 NOTE — PLAN OF CARE
ORIENTATION: A&Ox4    BEHAVIOR & AGGRESSION TOOL COLOR: Green   CIWA SCORE: NA   ABNL VS/O2: VSS, On RA   MOBILITY: Pt was on bedrest post Thrombectomy for 4 hours. Up with SBA this evening. Walked halls and in room. Steady and stable. Minor c/o of some throbbing at entry site. Resolved after getting back to bed. Site CDI/WDL.    PAIN MANAGMENT: Denies   DIET: Reg diet, good appetite and oral intake after being NPO   BOWEL/BLADDER: Pt voided post procedure after eating and drinking. No BM today.   ABNL LAB/BG: WDL   DRAIN/DEVICES: Puncture site for thrombectomy on back side of knee CDI. IV infusing Heparin at 1500u/hr until 0800 tomorrow AM, 05/24. Pt will start oral anticoagulants then.   TELEMETRY RHYTHM: NA   SKIN: WDL. RLE with trace of +1 edema. Slightly warmer than LLE, CMS intact.   TESTS/PROCEDURES: Thrombectomy completed today.   D/C DAY/GOALS/PLACE: Possible discharge tomorrow, 05/24  OTHER IMPORTANT INFO:

## 2019-05-25 NOTE — PLAN OF CARE
DATE & TIME: 2300-0730    ORIENTATION: A&Ox4   BEHAVIOR & AGGRESSION TOOL COLOR: green  CIWA SCORE: n/a   ABNL VS/O2: VSS on RA, 96/62, denies lightheadedness  MOBILITY: independent  PAIN MANAGMENT: denies  DIET: regular  BOWEL/BLADDER: WDL  ABNL LAB/BG: K+ 4.0  DRAIN/DEVICES: PIV infusing Heparin at 1500units/hr, order stopped at 0800 today to start eliquis  TELEMETRY RHYTHM: n/a  SKIN: edema on RLE, denies calf tenderness. Puncture site from thrombectomy WDL, dressing CDI  TESTS/PROCEDURES: thrombectomy done yesterday  D/C DAY/GOALS/PLACE: today, on xarelto or eliquis  OTHER IMPORTANT INFO: CMS intact, vascular to follow

## 2019-05-25 NOTE — DISCHARGE SUMMARY
[facility]    Discharge Summary  Hospitalist    Date of Admission:  5/23/2019  Date of Discharge:  5/25/2019  Discharging Provider: Haseeb Del Valle MD    Discharge Diagnoses   Principal Problem:    Proximal DVT Right leg  Active Problems:    Hyperlipidemia LDL goal <100    Benign essential hypertension    History of Left Leg DVT 2011      History of Present Illness   54-year-old male with history of hypertension, hyperlipidemia, history of DVT in 2011 to the left lower extremity, who was transferred from [facility] for extensive right lower extremity DVT.      According to the patient, he has been having right lower leg pain and swelling for about 1 month after his travel to [place] of about a 5-hour drive.  Since then, he has been seen by the orthopedic and received a shot in his right knee for possible arthritis, but did not improve the swelling. He also recalls a 12 hour drive up to [place] to go fishing on 3/29, and then slipped on the ice, bruised his right leg and hip, and sat for 2 days ice fishing, then drove 12 hours home.       When the patient had a DVT in the left lower extremity in 2011, he had extensive workup done for malignancy and for thrombosis as well, which were all negative.       Hospital Course   Admitted to medical floor on IV heparin, seen by IR and underwent a thrombectomy with extensive thrombus above the popliteal vein removed, and then started on Eliquis 10 mg bid for 1 week then 5 mg bid indefinite because this was his second thrombotic event.  He was seen by vascular medicine who will follow-up in 3-4 weeks.  His aspirin was discontinued, and he was switched to Crestor for his hyperlipidemia for better LDL control.      Haseeb Del Valle MD  Pager: [phone]  Cell Phone:  [phone]       Significant Results and Procedures   As above    Pending Results   These results will be followed up by Dr. Del Valle  Unresulted Labs Ordered in the Past 30  Days of this Admission     No orders found from 3/24/2019 to 5/24/2019.          Code Status   Full Code       Primary Care Physician   Gemma Infante    Physical Exam   Temp: 98.2  F (36.8  C) Temp src: Oral BP: 131/90 Pulse: 86 Heart Rate: 95 Resp: 16 SpO2: 93 % O2 Device: None (Room air) Oxygen Delivery: 2 LPM  Vitals:    05/24/19 0521 05/25/19 0500   Weight: 103.3 kg (227 lb 11.8 oz) 101.8 kg (224 lb 6.9 oz)     Vital Signs with Ranges  Temp:  [97.7  F (36.5  C)-98.2  F (36.8  C)] 98.2  F (36.8  C)  Pulse:  [77-93] 86  Heart Rate:  [77-95] 95  Resp:  [10-22] 16  BP: ()/(62-93) 131/90  SpO2:  [93 %-97 %] 93 %  I/O last 3 completed shifts:  In: 300 [P.O.:300]  Out: -     Exam on discharge:   Lungs clear  CV with reg S1S2  Extrem without edema    # Discharge Pain Plan:   - Patient currently has NO PAIN and is not being prescribed pain medications on discharge.      Discharge Disposition   Discharged to home  Condition at discharge: Good    Consultations This Hospital Stay   PHARMACY TO DOSE HEPARIN  PHARMACY LIAISON FOR MEDICATION COVERAGE CONSULT    Time Spent on this Encounter   I spent a total of 35 minutes discharging this patient.     Discharge Orders      US Lower Extremity Venous Duplex Right     Reason for your hospital stay    Right leg blood clot     Activity    Your activity upon discharge: avoid strenuous activity for 5 days, then activity as tolerated.     Discharge Instructions     Follow-up and recommended labs and tests     Follow up with primary care provider, Gemma Infante, in 5 days, then follow-up with Dr. Mckeon in 3-4 weeks concerning ongoing anticoagulation.     Full Code     Diet    Follow this diet upon discharge: Orders Placed This Encounter      Regular Diet Adult     Discharge Medications   Current Discharge Medication List      START taking these medications    Details   apixaban ANTICOAGULANT (ELIQUIS) 5 MG tablet 10 mg bid for 1 week, then 5 mg bid  Qty: 44  tablet, Refills: 3    Associated Diagnoses: Acute deep vein thrombosis (DVT) of proximal vein of right lower extremity (H)      rosuvastatin (CRESTOR) 10 MG tablet Take 1 tablet (10 mg) by mouth daily  Qty: 30 tablet, Refills: 1    Associated Diagnoses: Hyperlipidemia LDL goal <100         CONTINUE these medications which have NOT CHANGED    Details   Fish Oil-Cholecalciferol (FISH OIL + D3 PO) Take 1 capsule by mouth daily       lisinopril (PRINIVIL/ZESTRIL) 10 MG tablet Take 1 tablet (10 mg) by mouth daily  Qty: 90 tablet, Refills: 3    Associated Diagnoses: Benign essential hypertension      TURMERIC PO Take 1 tablet by mouth daily          STOP taking these medications       BABY ASPIRIN PO Comments:   Reason for Stopping:         NIACIN-50 PO Comments:   Reason for Stopping:         simvastatin (ZOCOR) 20 MG tablet Comments:   Reason for Stopping:             Allergies   No Known Allergies  Data   Most Recent 3 CBC's:  Recent Labs   Lab Test 05/24/19  0810 05/23/19 2008 05/23/19  1605   WBC 6.0 5.9 6.6   HGB 13.8 14.2 15.0   MCV 89 89 90    180 204      Most Recent 3 BMP's:  Recent Labs   Lab Test 05/24/19  0810 04/18/19  0821 05/24/18  1005    139 143   POTASSIUM 4.0 4.2 4.9   CHLORIDE 111* 106 108   CO2 27 24 26   BUN 12 12 16   CR 0.91 0.87 1.09   ANIONGAP 3 9 9   BONNY 9.2 9.7 9.5   GLC 93 91 97     Most Recent 2 LFT's:  Recent Labs   Lab Test 05/24/19  0810 04/18/19  0821   AST 8 16   ALT 15 22   ALKPHOS 64 77   BILITOTAL 0.5 0.6     Most Recent INR's and Anticoagulation Dosing History:  Anticoagulation Dose History     Recent Dosing and Labs Latest Ref Rng & Units 5/23/2019 5/24/2019    INR 0.86 - 1.14 0.97 0.99        Most Recent 3 Troponin's:  Recent Labs   Lab Test 05/23/19  1605   TROPI <0.015     Most Recent Cholesterol Panel:  Recent Labs   Lab Test 04/18/19  0821   CHOL 232*   *   HDL 55   TRIG 141     Most Recent 6 Bacteria Isolates From Any Culture (See EPIC Reports for Culture  Details):No lab results found.  Most Recent TSH, T4 and A1c Labs:  Recent Labs   Lab Test 04/18/19  0821   A1C 5.0

## 2019-05-25 NOTE — PROGRESS NOTES
Discharge    Patient discharged to home via vehicle with wife    Care plan note    DATE & TIME: 05/25/2019 2568-0615    ORIENTATION: Alert and oriented x 4   BEHAVIOR & AGGRESSION TOOL COLOR: Green  CIWA SCORE: NA   ABNL VS/O2: VSS on room air  MOBILITY: Independent  PAIN MANAGMENT: Denies  DIET: Tolerating Regular diet  BOWEL/BLADDER: Continent  ABNL LAB/BG: NA  DRAIN/DEVICES:   TELEMETRY RHYTHM: NA  SKIN: +1 edema to RLE, denies pain or tenderness. Puncture site from Thrombectomy is covered and dressing is CDI  TESTS/PROCEDURES:   D/C DAY/GOALS/PLACE: Discharge to home today  OTHER IMPORTANT INFO: Heparin discontinued, Eloquis started today      Listed belongings gathered and returned to patient. Yes  Care Plan and Patient education resolved: Yes  Prescriptions if needed, hard copies sent with patient  Yes  Home and hospital acquired medications returned to patient: NA  Medication Bin checked and emptied on discharge Yes  Follow up appointment made for patient: No, patient will call to schedule follow-up appointments

## 2019-05-26 LAB — KCT BLD-ACNC: 168 SEC (ref 75–150)

## 2019-05-29 ENCOUNTER — TELEPHONE (OUTPATIENT)
Dept: FAMILY MEDICINE | Facility: CLINIC | Age: 54
End: 2019-05-29

## 2019-05-30 ENCOUNTER — OFFICE VISIT (OUTPATIENT)
Dept: RADIOLOGY | Facility: CLINIC | Age: 54
End: 2019-05-30
Attending: RADIOLOGY
Payer: COMMERCIAL

## 2019-05-30 DIAGNOSIS — I82.401 DEEP VEIN THROMBOSIS (DVT) OF RIGHT LOWER EXTREMITY, UNSPECIFIED CHRONICITY, UNSPECIFIED VEIN (H): Primary | ICD-10-CM

## 2019-05-30 PROCEDURE — 99207 ZZC NO CHARGE NURSE ONLY: CPT | Performed by: RADIOLOGY

## 2019-05-31 NOTE — PROGRESS NOTES
Patient here for removal of right popliteal vein suture.  Prepped area with chloraprep and removed silk suture.  Sterile dressing applied to site.  Site dry and intact.  Sarah Chapman RN  IR nurse clinician  [phone]

## 2019-06-04 ENCOUNTER — TELEPHONE (OUTPATIENT)
Dept: FAMILY MEDICINE | Facility: CLINIC | Age: 54
End: 2019-06-04

## 2019-06-04 ENCOUNTER — TELEPHONE (OUTPATIENT)
Dept: OTHER | Facility: CLINIC | Age: 54
End: 2019-06-04

## 2019-06-04 NOTE — TELEPHONE ENCOUNTER
Reason for Call:  Other call back    Detailed comments: Patient is requesting a call back. States he recently had a DVT blood clot and his employer would like him to go on short term disability until his follow up appointment with the vascular surgeon. States he needs to file a claim today and would like to touch base with PCP. Please assist. Thanks!    Phone Number Patient can be reached at: Home number on file [phone] (home)    Best Time: Any    Can we leave a detailed message on this number? YES    Call taken on 6/4/2019 at 12:07 PM by Jania Scherer

## 2019-06-04 NOTE — TELEPHONE ENCOUNTER
Please let patient know I am out of the office today- am happy to see him tomorrow or talk to him by phone

## 2019-06-04 NOTE — TELEPHONE ENCOUNTER
Routing to provider - Naresh - please review and advise as appropriate      Do you want office visit ?

## 2019-06-05 ENCOUNTER — TELEPHONE (OUTPATIENT)
Dept: OTHER | Facility: CLINIC | Age: 54
End: 2019-06-05

## 2019-06-05 ENCOUNTER — VIRTUAL VISIT (OUTPATIENT)
Dept: FAMILY MEDICINE | Facility: CLINIC | Age: 54
End: 2019-06-05
Payer: COMMERCIAL

## 2019-06-05 DIAGNOSIS — I82.409 RECURRENT DEEP VEIN THROMBOSIS (DVT) OF LOWER EXTREMITY, UNSPECIFIED LATERALITY (H): Primary | ICD-10-CM

## 2019-06-05 PROCEDURE — 99442 ZZC PHYSICIAN TELEPHONE EVALUATION 11-20 MIN: CPT | Performed by: FAMILY MEDICINE

## 2019-06-05 NOTE — PROGRESS NOTES
"Adelfo Rivera is a 54 year old male who is being evaluated via a telephone visit.      The patient has been notified of following (by TAY Lopez    \"We have found that certain health care needs can be provided without the need for a physical exam.  This service lets us provide the care you need with a short phone conversation.  If a prescription is necessary we can send it directly to your pharmacy.  If lab work is needed we can place an order for that and you can then stop by our lab to have the test done at a later time.    This telephone visit will be conducted via 3 way call with the you (the patient) , the physician/provider, and a me all on the line at the same time.  This allows your physician/provider to have the phone conversation with you while I will be taking notes for your medical record.  We will have full access to your [place] medical record during this entire phone call.    Since this is like an office visit,  will bill your insurance company for this service.  Please check with your medical insurance if this type of telephone/virtual is covered . You may be responsible for the cost of this service if insurance coverage is denied.  The typical cost is $30 (10min), $59(11-20min) and $85 (21-30min)     If during the course of the call the physician/provider feels a telephone visit is not appropriate, you will not be charged for this service\"    Consent has been obtained for this service by care team member: yes.  See the scanned image in the medical record.      Pertinent parts of the the patient's medical history reviewed and confirmed by the provider included :  New DVT diagnosed  5- requiring thrombectomy due to extensive nature of DVT.  In hospital x 48 hours.  On Eliquis.  Has Follow-up 6-.    Total time of call between patient and provider was 15 minutes     Noa Infante MD (MD signature)  ===================================================    I have reviewed the " note as documented above.  This accurately captures the substance of my conversation with the patient,    Additional provider notes:    Call placed to Clinic Manager at [facility] to discuss case.  Manager to Follow-up with patient.    Patient's employer requests short-term disability paperwork be completed until patient follows up with [facility]-.    Assessment/Plan:  No diagnosis found.

## 2019-06-05 NOTE — TELEPHONE ENCOUNTER
[facility] called about the Rx for Adelfo's compression stockings. They received the new fax sent 6/4/19, with Dr Mckeon's name and NPI  number (written?) on it. They said that new NPI number is not found on the medicaid database. She thinks the number is wrong. Pls resend with correct NPI number to fax [phone]. Routing to Dr Mckeon's RN. Annabella Doshi -  at [facility]

## 2019-06-11 ENCOUNTER — HOSPITAL ENCOUNTER (OUTPATIENT)
Dept: LAB | Facility: CLINIC | Age: 54
Discharge: HOME | End: 2019-06-11
Attending: INTERNAL MEDICINE | Admitting: INTERNAL MEDICINE
Payer: COMMERCIAL

## 2019-06-11 ENCOUNTER — OFFICE VISIT (OUTPATIENT)
Dept: OTHER | Facility: CLINIC | Age: 54
End: 2019-06-11
Attending: INTERNAL MEDICINE
Payer: COMMERCIAL

## 2019-06-11 VITALS
OXYGEN SATURATION: 98 % | SYSTOLIC BLOOD PRESSURE: 125 MMHG | BODY MASS INDEX: 27.89 KG/M2 | HEIGHT: 76 IN | RESPIRATION RATE: 16 BRPM | DIASTOLIC BLOOD PRESSURE: 87 MMHG | HEART RATE: 53 BPM | WEIGHT: 229 LBS

## 2019-06-11 DIAGNOSIS — I82.511 CHRONIC DEEP VEIN THROMBOSIS (DVT) OF FEMORAL VEIN OF RIGHT LOWER EXTREMITY (H): Primary | ICD-10-CM

## 2019-06-11 DIAGNOSIS — I10 BENIGN ESSENTIAL HYPERTENSION: ICD-10-CM

## 2019-06-11 DIAGNOSIS — E78.5 HYPERLIPIDEMIA LDL GOAL <100: ICD-10-CM

## 2019-06-11 DIAGNOSIS — I82.511 CHRONIC DEEP VEIN THROMBOSIS (DVT) OF FEMORAL VEIN OF RIGHT LOWER EXTREMITY (H): ICD-10-CM

## 2019-06-11 PROCEDURE — 85730 THROMBOPLASTIN TIME PARTIAL: CPT | Performed by: INTERNAL MEDICINE

## 2019-06-11 PROCEDURE — 85303 CLOT INHIBIT PROT C ACTIVITY: CPT | Performed by: INTERNAL MEDICINE

## 2019-06-11 PROCEDURE — 00000167 ZZHCL STATISTIC INR NC: Performed by: INTERNAL MEDICINE

## 2019-06-11 PROCEDURE — 00000401 ZZHCL STATISTIC THROMBIN TIME NC: Performed by: INTERNAL MEDICINE

## 2019-06-11 PROCEDURE — G0463 HOSPITAL OUTPT CLINIC VISIT: HCPCS

## 2019-06-11 PROCEDURE — 86147 CARDIOLIPIN ANTIBODY EA IG: CPT | Performed by: INTERNAL MEDICINE

## 2019-06-11 PROCEDURE — 85306 CLOT INHIBIT PROT S FREE: CPT | Performed by: INTERNAL MEDICINE

## 2019-06-11 PROCEDURE — 99215 OFFICE O/P EST HI 40 MIN: CPT | Mod: ZP | Performed by: INTERNAL MEDICINE

## 2019-06-11 PROCEDURE — 85613 RUSSELL VIPER VENOM DILUTED: CPT | Performed by: INTERNAL MEDICINE

## 2019-06-11 PROCEDURE — 36415 COLL VENOUS BLD VENIPUNCTURE: CPT | Performed by: INTERNAL MEDICINE

## 2019-06-11 ASSESSMENT — MIFFLIN-ST. JEOR: SCORE: 1975.24

## 2019-06-11 NOTE — PROGRESS NOTES
"Adelfo Rivera is a 54 year old male who presents for:  Chief Complaint   Patient presents with     [facility] Follow Up - Time ok'd by Dr Mckeon *LMB 06/04/19        Vitals:    Vitals:    06/11/19 1414 06/11/19 1416   BP: 124/85 125/87   BP Location: Right arm Left arm   Patient Position: Chair Chair   Cuff Size: Adult Large Adult Large   Pulse: 53    Resp: 16    SpO2: 98%    Weight: 229 lb (103.9 kg)    Height: 6' 4\" (1.93 m)        BMI:  Estimated body mass index is 27.87 kg/m  as calculated from the following:    Height as of this encounter: 6' 4\" (1.93 m).    Weight as of this encounter: 229 lb (103.9 kg).    Pain Score:  Data Unavailable        Rommel Valerio    "

## 2019-06-11 NOTE — PATIENT INSTRUCTIONS
Please go for RT leg venous ultrasound as scheduled around June 23 /2019    Continue apixaban 5 mg twice a day with food    Hold lisinopril for now due to low blood pressure and monitor BP at home and if it is more than 130/80 may take 5mg ( 1/2 dose ) at night time    Take rosuvastatin at night time.    Go for labs today    May need to hold apixban 48 hrs before the knee injection and start one day later.

## 2019-06-11 NOTE — PROGRESS NOTES
SUBJECTIVE:  CC:  Hospital discharge follow up  Recent extensive Rt LE DVT underwent suction thrombectomy on 5/23/19  [employer] disability forms to fill out  Off  From work since discharge  Still Rt calf larger than  Left side.    HPI:   Adelfo Rivera is a 54 year old male here today for the follow-up visit.  He recently developed second episode of deep venous thrombosis in his right lower extremity which was provoked but he also has a history of left lower extremity DVT in the past.  He underwent right lower extremity suction thrombectomy and did fairly well since then.  He is taking apixaban 5 mg twice a day and tolerating without any problems.  He has been using compression stockings  He is off from the work since then.  Requesting short-term disability form to be filled out.  His maternal aunt has a history of factor V Leyden mutation.  His work requesting additional evaluation before he goes back to work again.  He is scheduled to undergo right lower extremity venous duplex around June 23 and scheduled to see Dr. Silva interventional radiologist.  He denies any chest pain, shortness of breath  Reviewed recent imaging studies with the patient and his wife    For full details please see initial consult note on May 24, 2019.  HISTORIES:  PROBLEM LIST:   Patient Active Problem List   Diagnosis     Hyperlipidemia LDL goal <100     Acute gouty arthritis     Benign essential hypertension     Impaired fasting glucose     Proximal DVT Right leg     History of Left Leg DVT 2011     PAST MEDICAL HISTORY:  Past Medical History:   Diagnosis Date     Benign essential hypertension 5/24/2018     DVT (deep venous thrombosis) (H) 5/23/2019     Hyperlipidemia LDL goal <100 5/24/2018     PAST SURGICAL HISTORY:  Past Surgical History:   Procedure Laterality Date     IR LOWER EXTREMITY VENOGRAM RIGHT  5/24/2019     CURRENT MEDICATIONS:  Current Outpatient Medications   Medication Sig Dispense Refill     apixaban ANTICOAGULANT  (ELIQUIS) 5 MG tablet 10 mg bid for 1 week, then 5 mg bid 44 tablet 3     lisinopril (PRINIVIL/ZESTRIL) 10 MG tablet Take 1 tablet (10 mg) by mouth daily 90 tablet 3     rosuvastatin (CRESTOR) 10 MG tablet Take 1 tablet (10 mg) by mouth daily 30 tablet 1     ALLERGIES:  No Known Allergies  SOCIAL HISTORY:  Social History     Socioeconomic History     Marital status:      Spouse name: Not on file     Number of children: Not on file     Years of education: Not on file     Highest education level: Not on file   Occupational History     Not on file   Social Needs     Financial resource strain: Not on file     Food insecurity:     Worry: Not on file     Inability: Not on file     Transportation needs:     Medical: Not on file     Non-medical: Not on file   Tobacco Use     Smoking status: Never Smoker     Smokeless tobacco: Never Used   Substance and Sexual Activity     Alcohol use: Yes     Comment: once in a while      Drug use: No     Sexual activity: Yes     Birth control/protection: None   Lifestyle     Physical activity:     Days per week: Not on file     Minutes per session: Not on file     Stress: Not on file   Relationships     Social connections:     Talks on phone: Not on file     Gets together: Not on file     Attends Buddhist service: Not on file     Active member of club or organization: Not on file     Attends meetings of clubs or organizations: Not on file     Relationship status: Not on file     Intimate partner violence:     Fear of current or ex partner: Not on file     Emotionally abused: Not on file     Physically abused: Not on file     Forced sexual activity: Not on file   Other Topics Concern     Parent/sibling w/ CABG, MI or angioplasty before 65F 55M? Not Asked   Social History Narrative     Not on file     FAMILY HISTORY:  Family History   Problem Relation Age of Onset     No Known Problems Mother      No Known Problems Father      REVIEW OF SYSTEMS:  CONSTITUTIONAL:no malaise, fatigue,  "or other general symptoms  EYES: no subjective changes in visual acuity, no photophobia  ENT/MOUTH: no complaints of rhinorrhea, nasal congestion, sore throat, hearing changes  RESP:no SOB  CV: no c/o exertional chest pressure or GRANT  GI: No abdominal pain, constipation, change in bowel movements, nausea, pyrosis, BRBPR  :no polyuria or polydipsia, no dysuria, no gross hematuria  MUSCULOSKELATAL:no arthalgias or myalgias  INTEGUMENTARY/SKIN: no pruritis, rashes, or moles with recent change in size, shape, or pigmentation  NEURO: no gross sensory or motor symptoms, no dizziness, no confusion  ENDOCRINE: no polyuria or polydipsia, no heat or cold intolerance  HEME/ALLERGY/IMMUNE: no fevers, chills, night sweats, or unwanted weight loss  PSYCHIATRIC: no depression, anxiety, or internal stimuli  EXAM:  /87 (BP Location: Left arm, Patient Position: Chair, Cuff Size: Adult Large)   Pulse 53   Resp 16   Ht 6' 4\" (1.93 m)   Wt 229 lb (103.9 kg)   SpO2 98%   BMI 27.87 kg/m    BMI: Body mass index is 27.87 kg/m .  GENERAL APPEARANCE:  Pleasant  Healthy appearing male , alert, active, no distress cooperative.  EXAM:  EYES: clear conjunctiva, no cataracts, no obvious fundoscopic abnormalities  HENT: oropharynx, nares, and TMs are WNL  NECK: no JVD, thyromegaly or lymphadenopathy, no cervical bruits  RESP: clear to auscultation without rales, wheezes, or rhonchi  CV: RRR, no murmurs, gallops, or rubs  LYMPH: no cervical , axillary, or inguinal lymphadenopathy appreciated  GI: NABS, ND/NT, no masses or organomegally appreciated  : normal external genitalia and anus, no lumps, masses  MS: no obvoius clinicallly relevant arthropathy, no evidence vasculitis  SKIN: no nevi clinically suspicious for malignancy are noted  NEURO: CN II-XII intact, no localizing sensory or motor abnoramlities noted, DTRs symmetrical bilaterally  PSYCH: Mental status exam reveals the pt to have normal mood and affect. There is no disorder " of thought form or content. There is no response to internal stimuli. There is no suicidal or homicidal ideation.  A/P:  (I82.511) Deep vein thrombosis (DVT) of femoral vein of right lower extremity  5/2019 (H) 2nd life time DVT ( initial 2011)  (primary encounter diagnosis)  (Total 2 episodes of DVTs in the lifetime and both of them appears provoked)    Comment: He underwent suction thrombectomy with Inari device on May 23, 2019, good response  Tolerating apixaban  Off from the work since then, work is requesting additional evaluation for causes of his DVT.  He is also requesting short-term disability form  He is planning to go back to work after ultrasound and evaluation by Dr. Silva and last week of June  .  He denies any chest pain, shortness of breath or palpitations  Maternal aunt with history of factor V Leyden mutation  Will check limited hypercoagulable studies  Short-term disability form filled out, if pending labs and ultrasound looks reasonable he can go back to work  Every 3 hours of car ride patient was suggested to take break 10 to 15 minutes walk and stretch out.  Plan: Factor 2 And 5 Mutation Analysis, Protein C         chromogenic, Protein S Antigen Free,         Cardiolipin Selina IgG and IgM, Lupus         Anticoagulant Panel            (E78.5) Hyperlipidemia LDL goal <70  Comment: Continue statin and repeat lipid panel in 3 months  Plan:     (I10) Benign essential hypertension  Comment: He was experiencing hypotension, dizziness and few occasions systolic blood pressure was around 90.  Suggested patient to hold the blood pressure medication and monitor if systolic blood pressure greater than 130 initiate half the dose of lisinopril 5 mg and take at bedtime.  Plan:     I have discussed with patient the risks, benefits, medications, treatment options and modalities.   I have instructed the patient to call or schedule a follow-up appointment if any problems or failure to improve.    Total patient  care time spent today 40 minutes face-to-face, more than 50% of the time spent counseling of the above-mentioned multiple issues.  Patient and his wife had a lot of questions and all of them were answered.   Disability form filled out copy sent to media and faxed to his workplace.    Charles Mckeon MD,FS,[facility]  Vascular Medicine

## 2019-06-12 LAB
CARDIOLIPIN ANTIBODY IGG: <1.6 GPL-U/ML (ref 0–19.9)
CARDIOLIPIN ANTIBODY IGM: 4.1 MPL-U/ML (ref 0–19.9)

## 2019-06-13 ENCOUNTER — TELEPHONE (OUTPATIENT)
Dept: OTHER | Facility: CLINIC | Age: 54
End: 2019-06-13

## 2019-06-13 LAB
LA PPP-IMP: NEGATIVE
PROT C ACT/NOR PPP CHRO: 122 % (ref 70–170)
PROT S FREE AG ACT/NOR PPP IA: 150 % (ref 70–148)

## 2019-06-13 NOTE — TELEPHONE ENCOUNTER
Patients company called in regards to his STD form that was completed a few days ago. They would like to know the period the patient is totally disabled from work and his estimated return date. Per form Dr. Mckeon advised that patient may go back to work after repeat leg ultrasound and labs come back in normal range.    Faxed this information back to Home .    Samantha LATHAMN, RN

## 2019-06-20 ENCOUNTER — HOSPITAL ENCOUNTER (OUTPATIENT)
Dept: ULTRASOUND IMAGING | Facility: CLINIC | Age: 54
Discharge: HOME | End: 2019-06-20
Attending: RADIOLOGY | Admitting: RADIOLOGY
Payer: COMMERCIAL

## 2019-06-20 ENCOUNTER — OFFICE VISIT (OUTPATIENT)
Dept: RADIOLOGY | Facility: CLINIC | Age: 54
End: 2019-06-20
Attending: RADIOLOGY
Payer: COMMERCIAL

## 2019-06-20 DIAGNOSIS — I82.401 DEEP VEIN THROMBOSIS (DVT) OF RIGHT LOWER EXTREMITY, UNSPECIFIED CHRONICITY, UNSPECIFIED VEIN (H): Primary | ICD-10-CM

## 2019-06-20 DIAGNOSIS — I82.4Y1 ACUTE DEEP VEIN THROMBOSIS (DVT) OF PROXIMAL VEIN OF RIGHT LOWER EXTREMITY (H): ICD-10-CM

## 2019-06-20 PROCEDURE — 93971 EXTREMITY STUDY: CPT | Mod: RT

## 2019-06-20 PROCEDURE — G0463 HOSPITAL OUTPT CLINIC VISIT: HCPCS

## 2019-06-21 ENCOUNTER — TELEPHONE (OUTPATIENT)
Dept: OTHER | Facility: CLINIC | Age: 54
End: 2019-06-21

## 2019-06-21 VITALS — OXYGEN SATURATION: 97 % | DIASTOLIC BLOOD PRESSURE: 83 MMHG | SYSTOLIC BLOOD PRESSURE: 130 MMHG | HEART RATE: 78 BPM

## 2019-06-21 NOTE — TELEPHONE ENCOUNTER
Notified Dr. Mckeon of ultrasound results.  Patient is cleared to return to work on June 24th.  Work release letter sent to patient and faxed to [employer]) [employer].  Patient will need ultrasound before seeing Dr. Mckeon in August.  Recommend scheduling that day.  Order placed and routed to .  Patient instructed to call if concerns.  Sarah Chapman RN  IR nurse clinician  [phone]

## 2019-06-21 NOTE — PROGRESS NOTES
Patient Name: Adelfo Rivera  Patient MRN: [id number]  Patient : 1965    HPI: This is a 54 year old  male presenting for follow-up evaluation of right leg deep vein thrombosis.  The patient presented to [facility] emergency room on 2019 after being evaluated at his primary clinic.  Ultrasound confirmed DVT of the right lower extremity.  Dr. Mckeon consulted with patient and the patient was brought to Interventional radiology on 2019 for right leg venogram.  The venogram showed significant chronic deep vein thrombosis throughout the right femoral and common femoral veins. Through the popliteal access ClotTriever devices was performed with multiple sweeps to removal acute and chronic thrombus. Images showed improvement of the appearance of the central right femoral vein, however there was persistent stenosis from the chronic thrombus.  Venoplasty was performed at the level of the central right femoral vein with an 8 x 40, 10 x 40 and 12 x 40 mm balloons. Post images showed improvement in the appearance of the central right femoral vein with only a minimal amount of the residual chronic deep vein thrombosis localized to the central femoral vein.      Today, he presents to clinic for 1 month follow up imaging and consult. He is doing well.  Continues to wear his compression stockings as prescribed. Denies heaviness or pain.  His only complaint is he has pain in his left knee.  He is being followed by [facility].  He needs an injection for pain relief.  The physician at [facility] will not perform an injection on anticoagulants.    Her current anticoagulation is being managed by Dr. Mckeon.    OBJECTIVE:   /83   Pulse 78   SpO2 97%     General Appearance: WDWN male in NAD  Lower Extremity: there is swelling in the right leg.  Varicosities noted through both lower extremities.  No redness noted.  Distal pulses are intact.  Right calf measurement: 43 cm  Left calf measurement 39  cm  Patient Active Problem List   Diagnosis     Hyperlipidemia LDL goal <100     Acute gouty arthritis     Benign essential hypertension     Impaired fasting glucose     Proximal DVT Right leg     History of Left Leg DVT 2011     Current Outpatient Medications   Medication     apixaban ANTICOAGULANT (ELIQUIS) 5 MG tablet     lisinopril (PRINIVIL/ZESTRIL) 10 MG tablet     rosuvastatin (CRESTOR) 10 MG tablet     No current facility-administered medications for this visit.          Imaging:   Results for orders placed or performed during the hospital encounter of 06/20/19   US Lower Extremity Venous Duplex Right    Narrative    PROCEDURE:  Venous Doppler ultrasound of the right lower extremity    DATE OF PROCEDURE:  6/20/2019 1:55 PM    CLINICAL HISTORY/INDICATION:  right lower extremity DVT status post mechanical thrombectomy.; Acute  deep vein thrombosis (DVT) of proximal vein of right lower extremity  (H)    COMPARISON:  Venous duplex ultrasound 5/23/2019    TECHNIQUE:   Grayscale, color-flow, and spectral waveform analysis were performed  of the deep veins of the right lower extremity    FINDINGS:   Occlusive thrombus is present within the superficial femoral and  popliteal veins.    The posterior tibial, peroneal and common femoral veins are free of  thrombus.      Impression    IMPRESSION:   Occlusive superficial femoral and popliteal vein thrombus. This is  improved from prior exam.    BASHIR FAIR MD         Assessment/Plan  This is a pleasant 54 year old gentlemen who is doing well following mechanical thrombectomy of right lower extremity DVT.  He is tolerating Apixaban.  We discussed his imaging that was performed today.  Although it is improved from previous ultrasound done on 5/23/2019 there is a segment of occlusive thrombus in the superficial femoral and popliteal vein.  I do not feel any intervention is necessary at this time due to the patient is asymptomatic.  However, if significant swelling or re  develops I instructed he contact our office immediately.  The patient is scheduled to see Dr. Mckeon in August.  Will repeat imaging at that time before his appointment.  The patient would like to go back to work.  I instructed that it is okay to go back to work, will confirm also with Dr. Mckeon.    Regarding, left knee injection I contact Bill MOYER.  He said that he able to the injection without hold blood thinners.  However, will need an order and patient needs to be aware of bleeding risk and complication.    I gave the patient [facility].        I met with the patient for 30 minutes of which >50% of the time was used to discuss treatment options and/or coordination of care.    Thank you for the consult. We will continue to monitor this patient for their vascular needs.    Dr. Radha Silva DO  Pager: [phone]  Interventional Radiology   [facility]  [phone]

## 2019-06-21 NOTE — PATIENT INSTRUCTIONS
See Dr. Mckeon in August, repeat ultrasound of right lower extremity DVT.  Patient may go back to work  Contact primary regarding order for knee injection.  [facility].  Patient will need to ask for Bill MOYER when scheduling the injection.  Patient cannot hold blood thinners.

## 2019-06-27 ENCOUNTER — MYC MEDICAL ADVICE (OUTPATIENT)
Dept: FAMILY MEDICINE | Facility: CLINIC | Age: 54
End: 2019-06-27

## 2019-06-27 NOTE — TELEPHONE ENCOUNTER
Per [employer] message below, pt requesting Cortizone shot in left knee from Bill Bellamy PAC with [facility].    Genevieve Lara, [facility] Referral Rep

## 2019-06-27 NOTE — TELEPHONE ENCOUNTER
Dr. Infante review my chart message and are we lacing an interventional radiology referral?  Not sure .

## 2019-06-28 NOTE — TELEPHONE ENCOUNTER
Writer was not able to locate forms, left a message Sarah to refax to fax machine in [place]: [phone].    Erika Bone

## 2019-07-05 ENCOUNTER — MEDICAL CORRESPONDENCE (OUTPATIENT)
Dept: HEALTH INFORMATION MANAGEMENT | Facility: CLINIC | Age: 54
End: 2019-07-05

## 2019-07-09 ENCOUNTER — TRANSFERRED RECORDS (OUTPATIENT)
Dept: HEALTH INFORMATION MANAGEMENT | Facility: CLINIC | Age: 54
End: 2019-07-09

## 2019-07-23 ENCOUNTER — TELEPHONE (OUTPATIENT)
Dept: FAMILY MEDICINE | Facility: CLINIC | Age: 54
End: 2019-07-23

## 2019-07-23 DIAGNOSIS — I82.4Y1 ACUTE DEEP VEIN THROMBOSIS (DVT) OF PROXIMAL VEIN OF RIGHT LOWER EXTREMITY (H): ICD-10-CM

## 2019-07-23 DIAGNOSIS — I10 BENIGN ESSENTIAL HYPERTENSION: ICD-10-CM

## 2019-07-23 DIAGNOSIS — E78.5 HYPERLIPIDEMIA LDL GOAL <100: ICD-10-CM

## 2019-07-23 RX ORDER — ROSUVASTATIN CALCIUM 10 MG/1
10 TABLET, COATED ORAL DAILY
Qty: 90 TABLET | Refills: 1 | Status: CANCELLED | OUTPATIENT
Start: 2019-07-23

## 2019-07-23 NOTE — TELEPHONE ENCOUNTER
Wants refills on all of his meds:    Lisinopril, wants refill, wants its changed to 5mg    Eliquis, wants refill    Rosutorvasin, wants refill    [facility] in [place] on [address]    Patient wants a nurse to call him with questions, the lisinopril is the only one that Naresh ordered.

## 2019-07-23 NOTE — TELEPHONE ENCOUNTER
"Dr Infante,    From 6/11/19 note with Dr Mckeon, vascular:    \"Continue apixaban 5 mg twice a day with food     Hold lisinopril for now due to low blood pressure and monitor BP at home and if it is more than 130/80 may take 5mg ( 1/2 dose ) at night time\"    Pt is suppose to return to see Dr Mckeon 2 months from above visit. He does have appt on 8/9 with him and will have another US then.     His BP got too low so wants to reduce to 5 mg lisinopril.   BP Readings from Last 3 Encounters:   06/21/19 130/83   06/11/19 125/87   05/25/19 131/90     Now that he is taking 5 mg he is getting  127/130 /82-87. (he has 5 mg tabs at home but needs new rx)    Can you refill his meds. Linda said he should take simvastatin , not rosuvastatin.     LDL Cholesterol Calculated   Date Value Ref Range Status   04/18/2019 149 (H) <100 mg/dL Final     Comment:     Above desirable:  100-129 mg/dl  Borderline High:  130-159 mg/dL  High:             160-189 mg/dL  Very high:       >189 mg/dl          Farzaneh Blue, RN, BSN       "

## 2019-07-27 RX ORDER — SIMVASTATIN 20 MG
20 TABLET ORAL AT BEDTIME
Qty: 90 TABLET | Refills: 3 | Status: SHIPPED | OUTPATIENT
Start: 2019-07-27 | End: 2019-08-03

## 2019-07-27 RX ORDER — LISINOPRIL 5 MG/1
5 TABLET ORAL DAILY
Qty: 90 TABLET | Refills: 1 | Status: SHIPPED | OUTPATIENT
Start: 2019-07-27 | End: 2019-12-19

## 2019-08-01 ENCOUNTER — TELEPHONE (OUTPATIENT)
Dept: FAMILY MEDICINE | Facility: CLINIC | Age: 54
End: 2019-08-01

## 2019-08-01 DIAGNOSIS — E78.5 HYPERLIPIDEMIA LDL GOAL <100: Primary | ICD-10-CM

## 2019-08-01 NOTE — TELEPHONE ENCOUNTER
Reason for Call:  Other call back    Detailed comments: pt states he is following up on request to change lisiniprol from 10 mg to 5 and his simvastatin to      rosuvastatin (CRESTOR) 10 MG tablet , said he had issues at pharmacy and would like to discuss asap               Phone Number Patient can be reached at: Home number on file [phone] (home)    Best Time: asap    Can we leave a detailed message on this number? YES    Call taken on 8/1/2019 at 2:13 PM by Mary Jaquez

## 2019-08-02 NOTE — TELEPHONE ENCOUNTER
Fay Medley review:  Now patient states that Dr. Mckeon wanted him to be on the crestor and has this in his 6/11 notes when he saw him.  There must have been some miscommunication,  Patient understands that he is to be on Crestor and not simvastatin.  Do yo agree to sign off on this?   Leeann Macario RN

## 2019-08-02 NOTE — TELEPHONE ENCOUNTER
Vascular doc said he wants him on Crestor  Not the simvastatin       Take rosuvastatin at night time. Per vascular on 6/19  Ok to reorder at 10mg?     Marilyn Dawkins RN

## 2019-08-03 RX ORDER — ROSUVASTATIN CALCIUM 10 MG/1
10 TABLET, COATED ORAL DAILY
Qty: 90 TABLET | Refills: 3 | Status: SHIPPED | OUTPATIENT
Start: 2019-08-03 | End: 2020-07-06

## 2019-08-09 ENCOUNTER — HOSPITAL ENCOUNTER (OUTPATIENT)
Dept: ULTRASOUND IMAGING | Facility: CLINIC | Age: 54
Discharge: HOME | End: 2019-08-09
Attending: RADIOLOGY | Admitting: RADIOLOGY
Payer: COMMERCIAL

## 2019-08-09 ENCOUNTER — OFFICE VISIT (OUTPATIENT)
Dept: OTHER | Facility: CLINIC | Age: 54
End: 2019-08-09
Attending: INTERNAL MEDICINE
Payer: COMMERCIAL

## 2019-08-09 VITALS
DIASTOLIC BLOOD PRESSURE: 87 MMHG | OXYGEN SATURATION: 98 % | WEIGHT: 226.8 LBS | SYSTOLIC BLOOD PRESSURE: 125 MMHG | BODY MASS INDEX: 27.61 KG/M2 | HEART RATE: 99 BPM

## 2019-08-09 DIAGNOSIS — I82.401 DEEP VEIN THROMBOSIS (DVT) OF RIGHT LOWER EXTREMITY, UNSPECIFIED CHRONICITY, UNSPECIFIED VEIN (H): ICD-10-CM

## 2019-08-09 DIAGNOSIS — I10 BENIGN ESSENTIAL HYPERTENSION: ICD-10-CM

## 2019-08-09 DIAGNOSIS — I82.511 CHRONIC DEEP VEIN THROMBOSIS (DVT) OF FEMORAL VEIN OF RIGHT LOWER EXTREMITY (H): Primary | ICD-10-CM

## 2019-08-09 DIAGNOSIS — E78.5 HYPERLIPIDEMIA LDL GOAL <100: ICD-10-CM

## 2019-08-09 PROCEDURE — G0463 HOSPITAL OUTPT CLINIC VISIT: HCPCS | Mod: 25

## 2019-08-09 PROCEDURE — 99215 OFFICE O/P EST HI 40 MIN: CPT | Mod: ZP | Performed by: INTERNAL MEDICINE

## 2019-08-09 PROCEDURE — 93971 EXTREMITY STUDY: CPT | Mod: RT

## 2019-08-09 NOTE — PROGRESS NOTES
SUBJECTIVE:  CC:   Follow up  Recent extensive Rt LE DVT underwent suction thrombectomy on 5/23/19  Underwent right lower extremity venous duplex today before the visit  Still right leg is slightly larger than left  Taking apixaban 5 mg twice a day    HPI:   Adelfo Rivera is a 54 year old male here today for the follow-up visit.  He recently developed second episode of deep venous thrombosis in his right lower extremity which was provoked but he also has a history of left lower extremity DVT in the past.  He underwent right lower extremity suction thrombectomy and did fairly well he underwent June 20, 2019 venous duplex which still showed occlusive superficial femoral and popliteal vein thrombus and that was improved compared to previous ultrasound.    Today he underwent right lower extremity venous duplex and he still has a persistent occlusive thrombus throughout the right femoral and popliteal veins and also per report degree of thrombosis progressed to posterior tibial veins gastrocnemius and soleal veins.   he is taking apixaban 5 mg twice a day and tolerating without any problems.  He has been using compression stockings  He is back to work and planning to travel to [place] next week and after his [place] trip 2 weeks later he is also planning to drive to [place] few hours  His maternal aunt has a history of factor V Leyden mutation.  He denies any chest pain, shortness of breath or palpitations  Reviewed recent imaging studies with the patient     For full details please see initial consult note on May 24, 2019.  HISTORIES:  PROBLEM LIST:   Patient Active Problem List   Diagnosis     Hyperlipidemia LDL goal <100     Acute gouty arthritis     Benign essential hypertension     Impaired fasting glucose     Proximal DVT Right leg     History of Left Leg DVT 2011     PAST MEDICAL HISTORY:  Past Medical History:   Diagnosis Date     Benign essential hypertension 5/24/2018     DVT (deep venous thrombosis)  (H) 5/23/2019     Hyperlipidemia LDL goal <100 5/24/2018     PAST SURGICAL HISTORY:  Past Surgical History:   Procedure Laterality Date     IR LOWER EXTREMITY VENOGRAM RIGHT  5/24/2019     CURRENT MEDICATIONS:  Current Outpatient Medications   Medication Sig Dispense Refill     apixaban ANTICOAGULANT (ELIQUIS) 5 MG tablet Take 1 tablet (5 mg) by mouth 2 times daily 180 tablet 3     lisinopril (PRINIVIL/ZESTRIL) 5 MG tablet Take 1 tablet (5 mg) by mouth daily 90 tablet 1     rosuvastatin (CRESTOR) 10 MG tablet Take 1 tablet (10 mg) by mouth daily 90 tablet 3     ALLERGIES:  No Known Allergies  SOCIAL HISTORY:  Social History     Socioeconomic History     Marital status:      Spouse name: Not on file     Number of children: Not on file     Years of education: Not on file     Highest education level: Not on file   Occupational History     Not on file   Social Needs     Financial resource strain: Not on file     Food insecurity:     Worry: Not on file     Inability: Not on file     Transportation needs:     Medical: Not on file     Non-medical: Not on file   Tobacco Use     Smoking status: Never Smoker     Smokeless tobacco: Never Used   Substance and Sexual Activity     Alcohol use: Yes     Comment: once in a while      Drug use: No     Sexual activity: Yes     Birth control/protection: None   Lifestyle     Physical activity:     Days per week: Not on file     Minutes per session: Not on file     Stress: Not on file   Relationships     Social connections:     Talks on phone: Not on file     Gets together: Not on file     Attends Islam service: Not on file     Active member of club or organization: Not on file     Attends meetings of clubs or organizations: Not on file     Relationship status: Not on file     Intimate partner violence:     Fear of current or ex partner: Not on file     Emotionally abused: Not on file     Physically abused: Not on file     Forced sexual activity: Not on file   Other Topics  Concern     Parent/sibling w/ CABG, MI or angioplasty before 65F 55M? Not Asked   Social History Narrative     Not on file     FAMILY HISTORY:  Family History   Problem Relation Age of Onset     No Known Problems Mother      No Known Problems Father      REVIEW OF SYSTEMS:  CONSTITUTIONAL:no malaise, fatigue, or other general symptoms  EYES: no subjective changes in visual acuity, no photophobia  ENT/MOUTH: no complaints of rhinorrhea, nasal congestion, sore throat, hearing changes  RESP:no SOB  CV: no c/o exertional chest pressure or GRANT  GI: No abdominal pain, constipation, change in bowel movements, nausea, pyrosis, BRBPR  :no polyuria or polydipsia, no dysuria, no gross hematuria  MUSCULOSKELATAL:no arthalgias or myalgias  INTEGUMENTARY/SKIN: no pruritis, rashes, or moles with recent change in size, shape, or pigmentation  NEURO: no gross sensory or motor symptoms, no dizziness, no confusion  ENDOCRINE: no polyuria or polydipsia, no heat or cold intolerance  HEME/ALLERGY/IMMUNE: no fevers, chills, night sweats, or unwanted weight loss  PSYCHIATRIC: no depression, anxiety, or internal stimuli  EXAM:  /87 (BP Location: Right arm, Patient Position: Chair, Cuff Size: Adult Large)   Pulse 99   Wt 226 lb 12.8 oz (102.9 kg)   SpO2 98%   BMI 27.61 kg/m    BMI: Body mass index is 27.61 kg/m .  GENERAL APPEARANCE:  Pleasant  Healthy appearing male , alert, active, no distress cooperative.  EXAM:  EYES: clear conjunctiva, no cataracts, no obvious fundoscopic abnormalities  HENT: oropharynx, nares, and TMs are WNL  NECK: no JVD, thyromegaly or lymphadenopathy, no cervical bruits  RESP: clear to auscultation without rales, wheezes, or rhonchi  CV: RRR, no murmurs, gallops, or rubs  LYMPH: no cervical , axillary, or inguinal lymphadenopathy appreciated  GI: NABS, ND/NT, no masses or organomegally appreciated  : normal external genitalia and anus, no lumps, masses  MS: no obvoius clinicallly relevant arthropathy,  no evidence vasculitis  SKIN: no nevi clinically suspicious for malignancy are noted  NEURO: CN II-XII intact, no localizing sensory or motor abnoramlities noted, DTRs symmetrical bilaterally  PSYCH: Mental status exam reveals the pt to have normal mood and affect. There is no disorder of thought form or content. There is no response to internal stimuli. There is no suicidal or homicidal ideation.    VENOUS ULTRASOUND RIGHT LOWER EXTREMITY  8/9/2019 10:49 AM      HISTORY: Right lower extremity venous deep vein thrombosis. Mechanical  thrombectomy done 5/24/2019. Comparison study done 6/20/2019  see Dr. Mckeon. Deep vein thrombosis (DVT) of right lower extremity,  unspecified chronicity, unspecified vein (H).     COMPARISON: June 20, 2019     TECHNIQUE: Color Doppler and spectral waveform analysis performed  throughout the deep veins of the right lower extremity.     FINDINGS: The right external iliac, common femoral, and proximal  greater saphenous veins demonstrate normal blood flow and compression.  The femoral vein is occluded throughout its length, as is the  popliteal vein, with extension into the posterior tibial vein.  Peroneal vein is compressible. Occlusive thrombus also in  gastrocnemius and soleal veins. Contralateral left common femoral vein  is patent.                                                                      IMPRESSION: Persistent occlusive thrombus throughout the right femoral  and popliteal veins. However, degree of thrombosis appears to have  progressed into the posterior, gastrocnemius, and soleal veins.     CHRISTEN URIAS MD  A/P:  (I82.511) Deep vein thrombosis (DVT) of femoral vein of right lower extremity  5/2019 (H) 2nd life time DVT ( initial 2011)  (primary encounter diagnosis)  (Total 2 episodes of DVTs in the lifetime and both of them appears provoked)    Comment: He underwent suction thrombectomy with Inari device on May 23, 2019, initially good response  But underwent  follow-up 1 month right lower extremity venous duplex which showed still persistent DVT in the femoral vein and popliteal vein  Tolerating apixaban, he has been using compression stockings  .  He denies any chest pain, shortness of breath or palpitations  Maternal aunt with history of factor V Leyden mutation  Hypercoagulable studies are negative  He is planning a trip to [place] next week  He underwent right lower extremity venous duplex before visit today, reviewed and discussed results with the patient and surprisingly  He still continues to have a persistent occlusive thrombus throughout the right femoral and popliteal veins and also degree of thrombosis is slightly worse than before and progressed to posterior tibial veins gastrocnemius and soleal veins.  Leg is well perfused palpable pulses, motor or sensory function is normal    Plan:  Continue apixaban and suggested not to miss any doses and take with the food  Use compression stockings thigh-high and elevate the legs  During prolonged travel suggested patient to every 2 hours walk around for few minutes and stretch the legs etc.  Repeat venous duplex on August 20 and then see me same day around 3 PM, order placed and appointment arranged    (E78.5) Hyperlipidemia LDL goal <70  Comment: Continue statin and repeat lipid panel in 3 months  Plan:     (I10) Benign essential hypertension  Comment: He was experiencing hypotension, dizziness and few occasions systolic blood pressure was around 90.  Suggested patient to hold the blood pressure medication and monitor if systolic blood pressure greater than 130 initiate half the dose of lisinopril 5 mg and take at bedtime.  Plan:     I have discussed with patient the risks, benefits, medications, treatment options and modalities.   I have instructed the patient to call or schedule a follow-up appointment if any problems or failure to improve.    Total patient care time spent today 40 minutes face-to-face, more than 50%  of the time spent counseling of the above-mentioned multiple issues.  Patient  had a lot of questions and all of them were answered.       Charles Mckeon MD,FSVM,[facility]  Vascular Medicine

## 2019-08-09 NOTE — PATIENT INSTRUCTIONS
Please go for RT leg venous US  First on Aug 20th and see me at 3 pm     Continue apixaban 5 mg twice  A day     Use compression stockings thigh high day time  and elevate leg at night

## 2019-08-09 NOTE — NURSING NOTE
"Adelfo Rivera is a 54 year old male who presents for:  Chief Complaint   Patient presents with     RECHECK     R Lwr Ext Venous Dup (10:15 VHC, 11:00 LMG )2 month follow up        Vitals:    Vitals:    08/09/19 1112   BP: 125/87   BP Location: Right arm   Patient Position: Chair   Cuff Size: Adult Large   Pulse: 99   SpO2: 98%   Weight: 226 lb 12.8 oz (102.9 kg)       BMI:  Estimated body mass index is 27.61 kg/m  as calculated from the following:    Height as of 6/11/19: 6' 4\" (1.93 m).    Weight as of this encounter: 226 lb 12.8 oz (102.9 kg).    Pain Score:  Data Unavailable        Sujey Paulson MA    "

## 2019-08-12 ENCOUNTER — TELEPHONE (OUTPATIENT)
Dept: OTHER | Facility: CLINIC | Age: 54
End: 2019-08-12

## 2019-08-20 ENCOUNTER — HOSPITAL ENCOUNTER (OUTPATIENT)
Dept: ULTRASOUND IMAGING | Facility: CLINIC | Age: 54
Discharge: HOME | End: 2019-08-20
Attending: INTERNAL MEDICINE | Admitting: INTERNAL MEDICINE
Payer: COMMERCIAL

## 2019-08-20 ENCOUNTER — OFFICE VISIT (OUTPATIENT)
Dept: OTHER | Facility: CLINIC | Age: 54
End: 2019-08-20
Attending: INTERNAL MEDICINE
Payer: COMMERCIAL

## 2019-08-20 VITALS
OXYGEN SATURATION: 98 % | WEIGHT: 226.6 LBS | HEART RATE: 103 BPM | BODY MASS INDEX: 27.58 KG/M2 | DIASTOLIC BLOOD PRESSURE: 86 MMHG | SYSTOLIC BLOOD PRESSURE: 128 MMHG

## 2019-08-20 DIAGNOSIS — E78.5 HYPERLIPIDEMIA LDL GOAL <100: ICD-10-CM

## 2019-08-20 DIAGNOSIS — I10 BENIGN ESSENTIAL HYPERTENSION: ICD-10-CM

## 2019-08-20 DIAGNOSIS — I82.511 CHRONIC DEEP VEIN THROMBOSIS (DVT) OF FEMORAL VEIN OF RIGHT LOWER EXTREMITY (H): ICD-10-CM

## 2019-08-20 DIAGNOSIS — I82.511 CHRONIC DEEP VEIN THROMBOSIS (DVT) OF FEMORAL VEIN OF RIGHT LOWER EXTREMITY (H): Primary | ICD-10-CM

## 2019-08-20 PROCEDURE — 93971 EXTREMITY STUDY: CPT | Mod: RT

## 2019-08-20 PROCEDURE — G0463 HOSPITAL OUTPT CLINIC VISIT: HCPCS

## 2019-08-20 PROCEDURE — 99214 OFFICE O/P EST MOD 30 MIN: CPT | Mod: ZP | Performed by: INTERNAL MEDICINE

## 2019-08-20 NOTE — PATIENT INSTRUCTIONS
USE COMPRESSION STOCKINGS BOTH LEGS THIGH HIGH , ELEVATE LEG WHEN EVER POSSIBLE    TAKE 10-15 MINUTES BREAK EVERY 2-3 HOURS LONG DISTANCE DRIVE.AND WALK AROUND    CONTINUE ( ELIQUIS)  APIXABAN 5 MG TWICE A DAY     SEE ME IN 6 -7 WEEKS AND REPEAT LEG ULTRASOUND THAT TIME.    GET FASTING LIPIDS TEST BEFORE VISIT

## 2019-08-20 NOTE — NURSING NOTE
"Adelfo Rivera is a 54 year old male who presents for:  Chief Complaint   Patient presents with     RECHECK     RLE jaime US (2:30 VHC, 3:00 LMG) Recent extensive Rt LE DVT underwent suction thrombectomy on 5/23/19. f/u to 8/9/19 visit with Dr Mckeon        Vitals:    Vitals:    08/20/19 1458   BP: 128/86   BP Location: Right arm   Patient Position: Chair   Cuff Size: Adult Regular   Pulse: 103   SpO2: 98%   Weight: 226 lb 9.6 oz (102.8 kg)       BMI:  Estimated body mass index is 27.58 kg/m  as calculated from the following:    Height as of 6/11/19: 6' 4\" (1.93 m).    Weight as of this encounter: 226 lb 9.6 oz (102.8 kg).    Pain Score:  Data Unavailable        Sujey Paulson MA    "

## 2019-08-20 NOTE — PROGRESS NOTES
SUBJECTIVE:  CC:   Follow up  Recent extensive Rt LE DVT underwent suction thrombectomy on 5/23/19 unfortunately DVT recurred   Underwent right lower extremity venous duplex today before the visit today looks same as 2 weeks ago  Improved leg swelling, no pain  Taking apixaban 5 mg twice a day    HPI:   Adelfo Rivera is a 54 year old male here today for the follow-up visit.  He recently developed second episode of deep venous thrombosis in his right lower extremity which was provoked but he also has a history of left lower extremity DVT in the past.  He underwent right lower extremity suction thrombectomy and did fairly well he underwent June 20, 2019 venous duplex which still showed occlusive superficial femoral and popliteal vein thrombus and that was improved compared to previous ultrasound.    Today he underwent right lower extremity venous duplex and he still has a persistent occlusive thrombus throughout the right femoral and popliteal veins and also per report degree of thrombosis progressed to posterior tibial veins gastrocnemius and soleal veins.   he is taking apixaban 5 mg twice a day and tolerating without any problems.  He has been using compression stockings  He is back to work and planning to travel to [place] next week and after his [place] trip 2 weeks later he is also planning to drive to [place] few hours  His maternal aunt has a history of factor V Leyden mutation.  He denies any chest pain, shortness of breath or palpitations  Reviewed recent imaging studies with the patient     For full details please see initial consult note on May 24, 2019.  HISTORIES:  PROBLEM LIST:   Patient Active Problem List   Diagnosis     Hyperlipidemia LDL goal <100     Acute gouty arthritis     Benign essential hypertension     Impaired fasting glucose     Proximal DVT Right leg     History of Left Leg DVT 2011     PAST MEDICAL HISTORY:  Past Medical History:   Diagnosis Date     Benign essential hypertension  5/24/2018     DVT (deep venous thrombosis) (H) 5/23/2019     Hyperlipidemia LDL goal <100 5/24/2018     PAST SURGICAL HISTORY:  Past Surgical History:   Procedure Laterality Date     IR LOWER EXTREMITY VENOGRAM RIGHT  5/24/2019     CURRENT MEDICATIONS:  Current Outpatient Medications   Medication Sig Dispense Refill     apixaban ANTICOAGULANT (ELIQUIS) 5 MG tablet Take 1 tablet (5 mg) by mouth 2 times daily 180 tablet 3     lisinopril (PRINIVIL/ZESTRIL) 5 MG tablet Take 1 tablet (5 mg) by mouth daily 90 tablet 1     rosuvastatin (CRESTOR) 10 MG tablet Take 1 tablet (10 mg) by mouth daily 90 tablet 3     ALLERGIES:  No Known Allergies  SOCIAL HISTORY:  Social History     Socioeconomic History     Marital status:      Spouse name: Not on file     Number of children: Not on file     Years of education: Not on file     Highest education level: Not on file   Occupational History     Not on file   Social Needs     Financial resource strain: Not on file     Food insecurity:     Worry: Not on file     Inability: Not on file     Transportation needs:     Medical: Not on file     Non-medical: Not on file   Tobacco Use     Smoking status: Never Smoker     Smokeless tobacco: Never Used   Substance and Sexual Activity     Alcohol use: Yes     Comment: once in a while      Drug use: No     Sexual activity: Yes     Birth control/protection: None   Lifestyle     Physical activity:     Days per week: Not on file     Minutes per session: Not on file     Stress: Not on file   Relationships     Social connections:     Talks on phone: Not on file     Gets together: Not on file     Attends Yazdanism service: Not on file     Active member of club or organization: Not on file     Attends meetings of clubs or organizations: Not on file     Relationship status: Not on file     Intimate partner violence:     Fear of current or ex partner: Not on file     Emotionally abused: Not on file     Physically abused: Not on file     Forced  sexual activity: Not on file   Other Topics Concern     Parent/sibling w/ CABG, MI or angioplasty before 65F 55M? Not Asked   Social History Narrative     Not on file     FAMILY HISTORY:  Family History   Problem Relation Age of Onset     No Known Problems Mother      No Known Problems Father      REVIEW OF SYSTEMS:  CONSTITUTIONAL:no malaise, fatigue, or other general symptoms  EYES: no subjective changes in visual acuity, no photophobia  ENT/MOUTH: no complaints of rhinorrhea, nasal congestion, sore throat, hearing changes  RESP:no SOB  CV: no c/o exertional chest pressure or GRANT  GI: No abdominal pain, constipation, change in bowel movements, nausea, pyrosis, BRBPR  :no polyuria or polydipsia, no dysuria, no gross hematuria  MUSCULOSKELATAL:no arthalgias or myalgias  INTEGUMENTARY/SKIN: no pruritis, rashes, or moles with recent change in size, shape, or pigmentation  NEURO: no gross sensory or motor symptoms, no dizziness, no confusion  ENDOCRINE: no polyuria or polydipsia, no heat or cold intolerance  HEME/ALLERGY/IMMUNE: no fevers, chills, night sweats, or unwanted weight loss  PSYCHIATRIC: no depression, anxiety, or internal stimuli  EXAM:  /86 (BP Location: Right arm, Patient Position: Chair, Cuff Size: Adult Regular)   Pulse 103   Wt 226 lb 9.6 oz (102.8 kg)   SpO2 98%   BMI 27.58 kg/m    BMI: Body mass index is 27.58 kg/m .  GENERAL APPEARANCE:  Pleasant  Healthy appearing male , alert, active, no distress cooperative.  EXAM:  EYES: clear conjunctiva, no cataracts, no obvious fundoscopic abnormalities  HENT: oropharynx, nares, and TMs are WNL  NECK: no JVD, thyromegaly or lymphadenopathy, no cervical bruits  RESP: clear to auscultation without rales, wheezes, or rhonchi  CV: RRR, no murmurs, gallops, or rubs  LYMPH: no cervical , axillary, or inguinal lymphadenopathy appreciated  GI: NABS, ND/NT, no masses or organomegally appreciated  : normal external genitalia and anus, no lumps,  masses  MS: no obvoius clinicallly relevant arthropathy, no evidence vasculitis  SKIN: no nevi clinically suspicious for malignancy are noted  NEURO: CN II-XII intact, no localizing sensory or motor abnoramlities noted, DTRs symmetrical bilaterally  PSYCH: Mental status exam reveals the pt to have normal mood and affect. There is no disorder of thought form or content. There is no response to internal stimuli. There is no suicidal or homicidal ideation.    VENOUS ULTRASOUND RIGHT LOWER EXTREMITY  8/9/2019 10:49 AM      HISTORY: Right lower extremity venous deep vein thrombosis. Mechanical  thrombectomy done 5/24/2019. Comparison study done 6/20/2019  see Dr. Mckeon. Deep vein thrombosis (DVT) of right lower extremity,  unspecified chronicity, unspecified vein (H).     COMPARISON: June 20, 2019     TECHNIQUE: Color Doppler and spectral waveform analysis performed  throughout the deep veins of the right lower extremity.     FINDINGS: The right external iliac, common femoral, and proximal  greater saphenous veins demonstrate normal blood flow and compression.  The femoral vein is occluded throughout its length, as is the  popliteal vein, with extension into the posterior tibial vein.  Peroneal vein is compressible. Occlusive thrombus also in  gastrocnemius and soleal veins. Contralateral left common femoral vein  is patent.                                                                      IMPRESSION: Persistent occlusive thrombus throughout the right femoral  and popliteal veins. However, degree of thrombosis appears to have  progressed into the posterior, gastrocnemius, and soleal veins.     CHRISTEN URIAS MD  A/P:  (I82.511) Deep vein thrombosis (DVT) of femoral vein of right lower extremity  5/2019 (H) 2nd life time DVT ( initial 2011)  (primary encounter diagnosis)  (Total 2 episodes of DVTs in the lifetime and both of them appears provoked)    Comment: He underwent suction thrombectomy with Inari device on  May 23, 2019, initially good response  But underwent follow-up 1 month right lower extremity venous duplex which showed still persistent DVT in the femoral vein and popliteal vein  Tolerating apixaban, he has been using compression stockings  .  He denies any chest pain, shortness of breath or palpitations  Maternal aunt with history of factor V Leyden mutation  Hypercoagulable studies are negative    He underwent right lower extremity venous duplex before visit today, reviewed and discussed results with the patient and surprisingly  He still continues to have a persistent occlusive thrombus throughout the right femoral and popliteal veins and also degree of thrombosis is same asbefore and progressed to posterior tibial veins gastrocnemius and soleal veins.  Leg is well perfused palpable pulses, motor or sensory function is normal    Plan:  Continue apixaban and suggested not to miss any doses and take with the food  Use compression stockings thigh-high and elevate the legs  During prolonged travel suggested patient to every 2 hours walk around for few minutes and stretch the legs etc.  Repeat venous duplex in 7 weeks then see me    (E78.5) Hyperlipidemia LDL goal <70  Comment: Continue statin and repeat lipid panel in 3 months  Plan:     (I10) Benign essential hypertension  Comment: He was experiencing hypotension, dizziness and few occasions systolic blood pressure was around 90.  Suggested patient to hold the blood pressure medication and monitor if systolic blood pressure greater than 130 initiate half the dose of lisinopril 5 mg and take at bedtime.  Plan:     I have discussed with patient the risks, benefits, medications, treatment options and modalities.   I have instructed the patient to call or schedule a follow-up appointment if any problems or failure to improve.    Total patient care time spent today 25  minutes face-to-face, more than 50% of the time spent counseling of the above-mentioned multiple  issues.  Patient  had a lot of questions and all of them were answered.       Charles Mckeon MD,FS,[facility]  Vascular Medicine

## 2019-08-22 DIAGNOSIS — M10.9 ACUTE GOUTY ARTHRITIS: ICD-10-CM

## 2019-08-22 RX ORDER — INDOMETHACIN 50 MG/1
50 CAPSULE ORAL
Qty: 42 CAPSULE | Refills: 1 | Status: CANCELLED | OUTPATIENT
Start: 2019-08-22

## 2019-09-24 ENCOUNTER — TELEPHONE (OUTPATIENT)
Dept: FAMILY MEDICINE | Facility: CLINIC | Age: 54
End: 2019-09-24

## 2019-09-24 DIAGNOSIS — M25.562 LEFT KNEE PAIN, UNSPECIFIED CHRONICITY: Primary | ICD-10-CM

## 2019-09-24 NOTE — TELEPHONE ENCOUNTER
Reason for Call: Request for an order or referral:    Order or referral being requested: Left knee cortisone injection    Date needed: as soon as possible    Has the patient been seen by the PCP for this problem? YES    Additional comments: The patient has an appointment 9/26.  Please fax order to Guillermo MOYER at [employer] at     Phone number Patient can be reached at:  Home number on file [phone] (home)    Best Time:      Can we leave a detailed message on this number?  YES    Call taken on 9/24/2019 at 10:44 AM by Adelina Burrell

## 2019-09-24 NOTE — TELEPHONE ENCOUNTER
Dr. Mendez can you okay this cortisone injection for patient?  There was a consultation about this 6/20/19.  Patient only following through now?  And there was another DVT after that time. Dr. Garcia saw him 8/9/19.      Do you want to sign off on the cortisone injection ?    Leeann Macario RN

## 2019-09-24 NOTE — TELEPHONE ENCOUNTER
The correct information is [facility] to see Bill Hawkins PA-C. Yes, [facility] the [facility] network. Please fax Order for left knee cortisone injection to [phone].    Genevieve Lara Belden Referral Rep

## 2019-09-25 NOTE — TELEPHONE ENCOUNTER
Pt calling to check status, said he stopped taking his blood thinners,  hoping to get in tomorrow , said it can be faxed to [phone] delfino serrano and the main line is [phone] please advise.

## 2019-09-25 NOTE — TELEPHONE ENCOUNTER
Pt states can barely walk, needs to get shot right away as he travels often, would like to know its scheduled , would like to discuss.

## 2019-09-25 NOTE — TELEPHONE ENCOUNTER
Jenny from [facility] did not receive orders.     Writer refaxed it to [phone].    Erika Bone

## 2019-09-26 ENCOUNTER — TRANSFERRED RECORDS (OUTPATIENT)
Dept: HEALTH INFORMATION MANAGEMENT | Facility: CLINIC | Age: 54
End: 2019-09-26

## 2019-09-27 DIAGNOSIS — E78.5 HYPERLIPIDEMIA LDL GOAL <100: ICD-10-CM

## 2019-09-27 LAB
CHOLEST SERPL-MCNC: 171 MG/DL
HDLC SERPL-MCNC: 63 MG/DL
LDLC SERPL CALC-MCNC: 98 MG/DL
NONHDLC SERPL-MCNC: 108 MG/DL
TRIGL SERPL-MCNC: 51 MG/DL

## 2019-09-27 PROCEDURE — 36415 COLL VENOUS BLD VENIPUNCTURE: CPT | Performed by: INTERNAL MEDICINE

## 2019-09-27 PROCEDURE — 80061 LIPID PANEL: CPT | Performed by: INTERNAL MEDICINE

## 2019-09-27 NOTE — RESULT ENCOUNTER NOTE
To be discussed at patient's upcoming office visit on 10/3/19 at 1:30.  Vonda Croft RN BSN  [facility]

## 2019-10-02 ENCOUNTER — HEALTH MAINTENANCE LETTER (OUTPATIENT)
Age: 54
End: 2019-10-02

## 2019-10-03 ENCOUNTER — HOSPITAL ENCOUNTER (OUTPATIENT)
Dept: ULTRASOUND IMAGING | Facility: CLINIC | Age: 54
Discharge: HOME | End: 2019-10-03
Attending: INTERNAL MEDICINE | Admitting: INTERNAL MEDICINE
Payer: COMMERCIAL

## 2019-10-03 ENCOUNTER — OFFICE VISIT (OUTPATIENT)
Dept: OTHER | Facility: CLINIC | Age: 54
End: 2019-10-03
Attending: INTERNAL MEDICINE
Payer: COMMERCIAL

## 2019-10-03 VITALS
SYSTOLIC BLOOD PRESSURE: 117 MMHG | BODY MASS INDEX: 27.39 KG/M2 | OXYGEN SATURATION: 93 % | HEART RATE: 101 BPM | DIASTOLIC BLOOD PRESSURE: 81 MMHG | WEIGHT: 225 LBS

## 2019-10-03 DIAGNOSIS — I82.511 CHRONIC DEEP VEIN THROMBOSIS (DVT) OF FEMORAL VEIN OF RIGHT LOWER EXTREMITY (H): Primary | ICD-10-CM

## 2019-10-03 DIAGNOSIS — I82.511 CHRONIC DEEP VEIN THROMBOSIS (DVT) OF FEMORAL VEIN OF RIGHT LOWER EXTREMITY (H): ICD-10-CM

## 2019-10-03 DIAGNOSIS — E78.5 HYPERLIPIDEMIA LDL GOAL <100: ICD-10-CM

## 2019-10-03 DIAGNOSIS — I10 BENIGN ESSENTIAL HYPERTENSION: ICD-10-CM

## 2019-10-03 PROCEDURE — G0463 HOSPITAL OUTPT CLINIC VISIT: HCPCS

## 2019-10-03 PROCEDURE — 93971 EXTREMITY STUDY: CPT | Mod: RT

## 2019-10-03 PROCEDURE — 99214 OFFICE O/P EST MOD 30 MIN: CPT | Mod: ZP | Performed by: INTERNAL MEDICINE

## 2019-10-03 NOTE — PROGRESS NOTES
SUBJECTIVE:  CC:   Follow up  Recent extensive Rt LE DVT underwent suction thrombectomy on 5/23/19 unfortunately DVT recurred   Underwent right lower extremity venous duplex last vist and today  before the visit , slightly improved clot burden  Improved leg swelling, no pain  Taking apixaban 5 mg twice a day, tolerating without any problems  Blood pressure well controlled    HPI:   Adelfo Rivera is a 54 year old male here today for the follow-up visit.  He recently developed second episode of deep venous thrombosis in his right lower extremity which was provoked but he also has a history of left lower extremity DVT in the past.  He underwent right lower extremity suction thrombectomy and did fairly well he underwent June 20, 2019 venous duplex which still showed occlusive superficial femoral and popliteal vein thrombus and that was improved compared to previous ultrasound.    Today he underwent right lower extremity venous duplex and he still has a persistent occlusive thrombus throughout the right femoral and popliteal veins and also per report degree of thrombosis slightly improved   he is taking apixaban 5 mg twice a day and tolerating without any problems.  He has been using compression stockings  His maternal aunt has a history of factor V Leyden mutation.  He denies any chest pain, shortness of breath or palpitations  Reviewed recent imaging studies with the patient     For full details please see initial consult note on May 24, 2019 and follow-up notes.  HISTORIES:  PROBLEM LIST:   Patient Active Problem List   Diagnosis     Hyperlipidemia LDL goal <100     Acute gouty arthritis     Benign essential hypertension     Impaired fasting glucose     Proximal DVT Right leg     History of Left Leg DVT 2011     PAST MEDICAL HISTORY:  Past Medical History:   Diagnosis Date     Benign essential hypertension 5/24/2018     DVT (deep venous thrombosis) (H) 5/23/2019     Hyperlipidemia LDL goal <100 5/24/2018     PAST  SURGICAL HISTORY:  Past Surgical History:   Procedure Laterality Date     IR LOWER EXTREMITY VENOGRAM RIGHT  5/24/2019     CURRENT MEDICATIONS:  Current Outpatient Medications   Medication Sig Dispense Refill     apixaban ANTICOAGULANT (ELIQUIS) 5 MG tablet Take 1 tablet (5 mg) by mouth 2 times daily 180 tablet 3     lisinopril (PRINIVIL/ZESTRIL) 5 MG tablet Take 1 tablet (5 mg) by mouth daily 90 tablet 1     rosuvastatin (CRESTOR) 10 MG tablet Take 1 tablet (10 mg) by mouth daily 90 tablet 3     ALLERGIES:  No Known Allergies  SOCIAL HISTORY:  Social History     Socioeconomic History     Marital status:      Spouse name: Not on file     Number of children: Not on file     Years of education: Not on file     Highest education level: Not on file   Occupational History     Not on file   Social Needs     Financial resource strain: Not on file     Food insecurity:     Worry: Not on file     Inability: Not on file     Transportation needs:     Medical: Not on file     Non-medical: Not on file   Tobacco Use     Smoking status: Never Smoker     Smokeless tobacco: Never Used   Substance and Sexual Activity     Alcohol use: Yes     Comment: once in a while      Drug use: No     Sexual activity: Yes     Birth control/protection: None   Lifestyle     Physical activity:     Days per week: Not on file     Minutes per session: Not on file     Stress: Not on file   Relationships     Social connections:     Talks on phone: Not on file     Gets together: Not on file     Attends Muslim service: Not on file     Active member of club or organization: Not on file     Attends meetings of clubs or organizations: Not on file     Relationship status: Not on file     Intimate partner violence:     Fear of current or ex partner: Not on file     Emotionally abused: Not on file     Physically abused: Not on file     Forced sexual activity: Not on file   Other Topics Concern     Parent/sibling w/ CABG, MI or angioplasty before 65F 55M?  Not Asked   Social History Narrative     Not on file     FAMILY HISTORY:  Family History   Problem Relation Age of Onset     No Known Problems Mother      No Known Problems Father      REVIEW OF SYSTEMS:  CONSTITUTIONAL:no malaise, fatigue, or other general symptoms  EYES: no subjective changes in visual acuity, no photophobia  ENT/MOUTH: no complaints of rhinorrhea, nasal congestion, sore throat, hearing changes  RESP:no SOB  CV: no c/o exertional chest pressure or GRANT  GI: No abdominal pain, constipation, change in bowel movements, nausea, pyrosis, BRBPR  :no polyuria or polydipsia, no dysuria, no gross hematuria  MUSCULOSKELATAL:no arthalgias or myalgias  INTEGUMENTARY/SKIN: no pruritis, rashes, or moles with recent change in size, shape, or pigmentation  NEURO: no gross sensory or motor symptoms, no dizziness, no confusion  ENDOCRINE: no polyuria or polydipsia, no heat or cold intolerance  HEME/ALLERGY/IMMUNE: no fevers, chills, night sweats, or unwanted weight loss  PSYCHIATRIC: no depression, anxiety, or internal stimuli  EXAM:  /81 (BP Location: Right arm, Patient Position: Sitting, Cuff Size: Adult Large)   Pulse 101   Wt 225 lb (102.1 kg)   SpO2 93%   BMI 27.39 kg/m    BMI: Body mass index is 27.39 kg/m .  GENERAL APPEARANCE:  Pleasant  Healthy appearing male , alert, active, no distress cooperative.  EXAM:  EYES: clear conjunctiva, no cataracts, no obvious fundoscopic abnormalities  HENT: oropharynx, nares, and TMs are WNL  NECK: no JVD, thyromegaly or lymphadenopathy, no cervical bruits  RESP: clear to auscultation without rales, wheezes, or rhonchi  CV: RRR, no murmurs, gallops, or rubs  LYMPH: no cervical , axillary, or inguinal lymphadenopathy appreciated  GI: NABS, ND/NT, no masses or organomegally appreciated  : normal external genitalia and anus, no lumps, masses  MS: no obvoius clinicallly relevant arthropathy, no evidence vasculitis  SKIN: no nevi clinically suspicious for malignancy  are noted  NEURO: CN II-XII intact, no localizing sensory or motor abnoramlities noted, DTRs symmetrical bilaterally  PSYCH: Mental status exam reveals the pt to have normal mood and affect. There is no disorder of thought form or content. There is no response to internal stimuli. There is no suicidal or homicidal ideation.    Results for orders placed or performed during the hospital encounter of 10/03/19   US Lower Extremity Venous Duplex Right    Narrative    PROCEDURE:  Venous Doppler ultrasound of the right lower extremity    DATE OF PROCEDURE:  10/3/2019 1:12 PM    CLINICAL HISTORY/INDICATION:  Recent extensive Rt LE DVT underwent suction thrombectomy on 5/23/19  unfortunately DVT recurred; Chronic deep vein thrombosis (DVT) of  femoral vein of right lower extremity (H)    COMPARISON:  Follow-up lower extremity DVT    TECHNIQUE:   Grayscale, color-flow, and spectral waveform analysis were performed  of the deep veins of the right lower extremity    FINDINGS:   Lower extremity venous duplex 8/20/2019 continued extensive lower  extremity deep venous thrombus extending from the proximal superficial  femoral vein into the popliteal vein. Nonocclusive thrombus within the  posterior tibial vein, improved.      Impression    IMPRESSION:   Lower extremity venous thrombus extending from the proximal  superficial femoral vein to the popliteal vein with a nonocclusive  bone extending into the posterior tibial vein.    BASHIR FAIR MD       VENOUS ULTRASOUND RIGHT LOWER EXTREMITY  8/9/2019 10:49 AM      HISTORY: Right lower extremity venous deep vein thrombosis. Mechanical  thrombectomy done 5/24/2019. Comparison study done 6/20/2019  see Dr. Mckeon. Deep vein thrombosis (DVT) of right lower extremity,  unspecified chronicity, unspecified vein (H).     COMPARISON: June 20, 2019     TECHNIQUE: Color Doppler and spectral waveform analysis performed  throughout the deep veins of the right lower  extremity.     FINDINGS: The right external iliac, common femoral, and proximal  greater saphenous veins demonstrate normal blood flow and compression.  The femoral vein is occluded throughout its length, as is the  popliteal vein, with extension into the posterior tibial vein.  Peroneal vein is compressible. Occlusive thrombus also in  gastrocnemius and soleal veins. Contralateral left common femoral vein  is patent.                                                                      IMPRESSION: Persistent occlusive thrombus throughout the right femoral  and popliteal veins. However, degree of thrombosis appears to have  progressed into the posterior, gastrocnemius, and soleal veins.     CHRISTEN URIAS MD    A/P:  (I82.511) Deep vein thrombosis (DVT) of femoral vein of right lower extremity  5/2019 (H) 2nd life time DVT ( initial 2011)  (primary encounter diagnosis)  (Total 2 episodes of DVTs in the lifetime and both of them appears provoked)    Comment: He underwent suction thrombectomy with Inari device on May 23, 2019, initially good response  But underwent follow-up 1 month right lower extremity venous duplex which showed still persistent DVT in the femoral vein and popliteal vein  Tolerating apixaban, he has been using compression stockings, repeat ultrasound today slight improvement.  He is wondering about either apixaban is working or really helping?  He still has a 1 month supply of medications and he would like to switch to different medication if it is covered by his insurance  .  He denies any chest pain, shortness of breath or palpitations  Maternal aunt with history of factor V Leyden mutation  Hypercoagulable studies are negative  Leg is well perfused palpable pulses, motor or sensory function is normal    Plan:  Complete current apixaban 5 mg twice a day for a month or until the last then followed by start Xarelto 20 mg daily with supper  New prescription sent, coupons 30-day free voucher and $10  per month co-pay voucher given to the patient.  Discussed again side effects, patient to call back clinic if he develops any issues with xarelto  Use compression stockings thigh-high and elevate the legs  During prolonged travel suggested patient to every 2 hours walk around for few minutes and stretch the legs etc    (E78.5) Hyperlipidemia LDL goal <70  Good response with statin continue the same and TLC suggested  Comment: Continue statin and repeat lipid panel in 3 months  Plan:     (I10) Benign essential hypertension  On low-dose lisinopril tolerating without any problems.  No more side effects  Continue same    I have discussed with patient the risks, benefits, medications, treatment options and modalities.   I have instructed the patient to call or schedule a follow-up appointment if any problems or failure to improve.    Total patient care time spent today 25  minutes face-to-face, more than 50% of the time spent counseling of the above-mentioned multiple issues.  Patient  had a lot of questions and all of them were answered.     Return to clinic in second week of December 2019      Charles Mckeon MD,[facility]  Vascular Medicine

## 2019-10-03 NOTE — PATIENT INSTRUCTIONS
1. Stop apixaban once you complete current supply then take xarelto 20 mg daily with supper   We will fax new Rx     2. Use compression stockings, elevate legs    3. Slightly improved blood clot on todays venous duplex.     4.Continue rest of the medications same.

## 2019-12-02 ENCOUNTER — TELEPHONE (OUTPATIENT)
Dept: FAMILY MEDICINE | Facility: CLINIC | Age: 54
End: 2019-12-02

## 2019-12-02 NOTE — TELEPHONE ENCOUNTER
Reason for Call: Request for an order or referral:    Order or referral being requested: both shoulder injections    Date needed: as soon as possible    Has the patient been seen by the PCP for this problem? YES    Additional comments: Pt calling to request shoulder injections to be done by [facility]amy at [facility] who he states has done them in the past.    - Fax number is [phone]    Phone number Patient can be reached at:  Home number on file [phone] (home)    Best Time:  anytime    Can we leave a detailed message on this number?  YES    Call taken on 12/2/2019 at 2:07 PM by Nova Durbin

## 2019-12-03 NOTE — TELEPHONE ENCOUNTER
Spoke with pt who states that he does not want to contact [facility] because they told him he would need to come in and get an x-ray which he does not have time for. States that he had an x-ray at [facility] and does not want to go back there since they misdiagnosed a blood clot.     Pt informed he would then need to contact [facility] to send of the x-ray records to [place].     He states that Dr. Infante gave him an order for injection already and pt was informed it was for a knee injection, not shoulders, and that [place] was the one who ordered for him on 11/19.     Pt still did not understand the concept of having to go through [facility] for injection and still insists provider make the order.     Writer informed him that as of right now there was nothing more that writer could do for him but will send his concerns back to the care team.       Nova CHAND     [facility]

## 2019-12-03 NOTE — TELEPHONE ENCOUNTER
This was ordered by ORTHO 11--- he should request these orders to be resent to the person he prefers

## 2019-12-03 NOTE — TELEPHONE ENCOUNTER
Team Coordinators: please contact patient, he needs to contact ortho (appears it is [facility]) and have them send the orders to [facility] at [facility] . The order did not come from our clinic    Thank You!  Viktoriya Pugh, KENN  Triage Nurse

## 2019-12-03 NOTE — TELEPHONE ENCOUNTER
Patient calling to state that he has decided to go to the [facility] and does not need our assistance.    Please call patient if you have any questions.  OK to LM on VM.

## 2019-12-03 NOTE — TELEPHONE ENCOUNTER
Pt is calling to inquire about this request. Pt states he can not sleep at night and would like some relief soon.     Marilyn Monroy Patient Rep

## 2019-12-03 NOTE — TELEPHONE ENCOUNTER
Called patient - he states he now is planning on following up with [place] 12/11 and has an appointment scheduled - no further requests at this time

## 2019-12-19 ENCOUNTER — HOSPITAL ENCOUNTER (OUTPATIENT)
Dept: ULTRASOUND IMAGING | Facility: CLINIC | Age: 54
Discharge: HOME | End: 2019-12-19
Attending: INTERNAL MEDICINE | Admitting: INTERNAL MEDICINE
Payer: COMMERCIAL

## 2019-12-19 ENCOUNTER — OFFICE VISIT (OUTPATIENT)
Dept: OTHER | Facility: CLINIC | Age: 54
End: 2019-12-19
Attending: INTERNAL MEDICINE
Payer: COMMERCIAL

## 2019-12-19 VITALS
RESPIRATION RATE: 16 BRPM | HEIGHT: 76 IN | BODY MASS INDEX: 28.01 KG/M2 | HEART RATE: 88 BPM | WEIGHT: 230 LBS | DIASTOLIC BLOOD PRESSURE: 91 MMHG | SYSTOLIC BLOOD PRESSURE: 137 MMHG | OXYGEN SATURATION: 97 %

## 2019-12-19 DIAGNOSIS — I10 BENIGN ESSENTIAL HYPERTENSION: ICD-10-CM

## 2019-12-19 DIAGNOSIS — I82.511 CHRONIC DEEP VEIN THROMBOSIS (DVT) OF FEMORAL VEIN OF RIGHT LOWER EXTREMITY (H): ICD-10-CM

## 2019-12-19 DIAGNOSIS — I82.511 CHRONIC DEEP VEIN THROMBOSIS (DVT) OF FEMORAL VEIN OF RIGHT LOWER EXTREMITY (H): Primary | ICD-10-CM

## 2019-12-19 DIAGNOSIS — E78.5 HYPERLIPIDEMIA LDL GOAL <100: ICD-10-CM

## 2019-12-19 PROCEDURE — 99214 OFFICE O/P EST MOD 30 MIN: CPT | Mod: ZP | Performed by: INTERNAL MEDICINE

## 2019-12-19 PROCEDURE — G0463 HOSPITAL OUTPT CLINIC VISIT: HCPCS

## 2019-12-19 PROCEDURE — 93971 EXTREMITY STUDY: CPT | Mod: RT

## 2019-12-19 RX ORDER — LISINOPRIL 5 MG/1
5 TABLET ORAL DAILY
Qty: 90 TABLET | Refills: 3 | Status: SHIPPED | OUTPATIENT
Start: 2019-12-19 | End: 2020-09-10 | Stop reason: DRUGHIGH

## 2019-12-19 ASSESSMENT — MIFFLIN-ST. JEOR: SCORE: 1979.77

## 2019-12-19 NOTE — PATIENT INSTRUCTIONS
Continue xarelto 20 mg daily with dinner or supper    Use compression stockings, elevate legs    Please get fasting lipids at your next primary appointment

## 2019-12-19 NOTE — PROGRESS NOTES
"Adelfo Rivera is a 54 year old male who presents for:  Chief Complaint   Patient presents with     Nurse Visit     Right leg venous for DVT (VHC 12:30; LG 1:00) *Hx: extensive Rt LE DVT underwent suction thrombectomy on 5/23/19 which recurred. 10 week follow up*[facility] 12/09/19        Vitals:    Vitals:    12/19/19 1303   BP: (!) 137/91   BP Location: Right arm   Patient Position: Chair   Cuff Size: Adult Regular   Pulse: 88   Resp: 16   SpO2: 97%   Weight: 230 lb (104.3 kg)   Height: 6' 4\" (1.93 m)       BMI:  Estimated body mass index is 28 kg/m  as calculated from the following:    Height as of this encounter: 6' 4\" (1.93 m).    Weight as of this encounter: 230 lb (104.3 kg).    Pain Score:  Data Unavailable        Rommel Valerio CMA    "

## 2019-12-19 NOTE — PROGRESS NOTES
SUBJECTIVE:  CC:   Follow up  Recent extensive Rt LE DVT underwent suction thrombectomy on 5/23/19 unfortunately DVT recurred   Underwent right lower extremity venous duplex last vist and today  before the visit , slightly improved clot burden  Improved leg swelling, no pain  Taking xarelto 20 mg daily   Blood pressure fluctuating     HPI:   Adelfo Rivera is a 54 year old male here today for the follow-up visit.  He recently developed second episode of deep venous thrombosis in his right lower extremity which was provoked but he also has a history of left lower extremity DVT in the past.  He underwent right lower extremity suction thrombectomy and did fairly well he underwent June 20, 2019 venous duplex which still showed occlusive superficial femoral and popliteal vein thrombus and that was improved compared to previous ultrasound.    Today he underwent right lower extremity venous duplex and he still has a persistent occlusive thrombus throughout the right femoral and popliteal veins and also per report degree of thrombosis slightly improved   he is taking apixaban 5 mg twice a day and tolerating without any problems.  He has been using compression stockings  His maternal aunt has a history of factor V Leyden mutation.  He denies any chest pain, shortness of breath or palpitations  Reviewed recent imaging studies with the patient     For full details please see initial consult note on May 24, 2019 and follow-up notes.  HISTORIES:  PROBLEM LIST:   Patient Active Problem List   Diagnosis     Hyperlipidemia LDL goal <100     Acute gouty arthritis     Benign essential hypertension     Impaired fasting glucose     Proximal DVT Right leg     History of Left Leg DVT 2011     PAST MEDICAL HISTORY:  Past Medical History:   Diagnosis Date     Benign essential hypertension 5/24/2018     DVT (deep venous thrombosis) (H) 5/23/2019     Hyperlipidemia LDL goal <100 5/24/2018     PAST SURGICAL HISTORY:  Past Surgical  History:   Procedure Laterality Date     IR LOWER EXTREMITY VENOGRAM RIGHT  5/24/2019     CURRENT MEDICATIONS:  Current Outpatient Medications   Medication Sig Dispense Refill     lisinopril (PRINIVIL/ZESTRIL) 5 MG tablet Take 1 tablet (5 mg) by mouth daily 90 tablet 3     rivaroxaban ANTICOAGULANT (XARELTO ANTICOAGULANT) 20 MG TABS tablet Take 1 tablet (20 mg) by mouth daily (with dinner) 90 tablet 3     rosuvastatin (CRESTOR) 10 MG tablet Take 1 tablet (10 mg) by mouth daily 90 tablet 3     ALLERGIES:  No Known Allergies  SOCIAL HISTORY:  Social History     Socioeconomic History     Marital status:      Spouse name: Not on file     Number of children: Not on file     Years of education: Not on file     Highest education level: Not on file   Occupational History     Not on file   Social Needs     Financial resource strain: Not on file     Food insecurity:     Worry: Not on file     Inability: Not on file     Transportation needs:     Medical: Not on file     Non-medical: Not on file   Tobacco Use     Smoking status: Never Smoker     Smokeless tobacco: Never Used   Substance and Sexual Activity     Alcohol use: Yes     Comment: once in a while      Drug use: No     Sexual activity: Yes     Birth control/protection: None   Lifestyle     Physical activity:     Days per week: Not on file     Minutes per session: Not on file     Stress: Not on file   Relationships     Social connections:     Talks on phone: Not on file     Gets together: Not on file     Attends Voodoo service: Not on file     Active member of club or organization: Not on file     Attends meetings of clubs or organizations: Not on file     Relationship status: Not on file     Intimate partner violence:     Fear of current or ex partner: Not on file     Emotionally abused: Not on file     Physically abused: Not on file     Forced sexual activity: Not on file   Other Topics Concern     Parent/sibling w/ CABG, MI or angioplasty before 65F 55M? Not  "Asked   Social History Narrative     Not on file     FAMILY HISTORY:  Family History   Problem Relation Age of Onset     No Known Problems Mother      No Known Problems Father      REVIEW OF SYSTEMS:  CONSTITUTIONAL:no malaise, fatigue, or other general symptoms  EYES: no subjective changes in visual acuity, no photophobia  ENT/MOUTH: no complaints of rhinorrhea, nasal congestion, sore throat, hearing changes  RESP:no SOB  CV: no c/o exertional chest pressure or GRANT  GI: No abdominal pain, constipation, change in bowel movements, nausea, pyrosis, BRBPR  :no polyuria or polydipsia, no dysuria, no gross hematuria  MUSCULOSKELATAL:no arthalgias or myalgias  INTEGUMENTARY/SKIN: no pruritis, rashes, or moles with recent change in size, shape, or pigmentation  NEURO: no gross sensory or motor symptoms, no dizziness, no confusion  ENDOCRINE: no polyuria or polydipsia, no heat or cold intolerance  HEME/ALLERGY/IMMUNE: no fevers, chills, night sweats, or unwanted weight loss  PSYCHIATRIC: no depression, anxiety, or internal stimuli  EXAM:  BP (!) 137/91 (BP Location: Right arm, Patient Position: Chair, Cuff Size: Adult Regular)   Pulse 88   Resp 16   Ht 6' 4\" (1.93 m)   Wt 230 lb (104.3 kg)   SpO2 97%   BMI 28.00 kg/m    BMI: Body mass index is 28 kg/m .  GENERAL APPEARANCE:  Pleasant  Healthy appearing male , alert, active, no distress cooperative.  EXAM:  EYES: clear conjunctiva, no cataracts, no obvious fundoscopic abnormalities  HENT: oropharynx, nares, and TMs are WNL  NECK: no JVD, thyromegaly or lymphadenopathy, no cervical bruits  RESP: clear to auscultation without rales, wheezes, or rhonchi  CV: RRR, no murmurs, gallops, or rubs  LYMPH: no cervical , axillary, or inguinal lymphadenopathy appreciated  GI: NABS, ND/NT, no masses or organomegally appreciated  : normal external genitalia and anus, no lumps, masses  MS: no obvoius clinicallly relevant arthropathy, no evidence vasculitis  SKIN: no nevi " clinically suspicious for malignancy are noted  NEURO: CN II-XII intact, no localizing sensory or motor abnoramlities noted, DTRs symmetrical bilaterally  PSYCH: Mental status exam reveals the pt to have normal mood and affect. There is no disorder of thought form or content. There is no response to internal stimuli. There is no suicidal or homicidal ideation.      VENOUS ULTRASOUND RIGHT LOWER EXTREMITY  8/9/2019 10:49 AM      HISTORY: Right lower extremity venous deep vein thrombosis. Mechanical  thrombectomy done 5/24/2019. Comparison study done 6/20/2019  see Dr. Mckeon. Deep vein thrombosis (DVT) of right lower extremity,  unspecified chronicity, unspecified vein (H).     COMPARISON: June 20, 2019     TECHNIQUE: Color Doppler and spectral waveform analysis performed  throughout the deep veins of the right lower extremity.     FINDINGS: The right external iliac, common femoral, and proximal  greater saphenous veins demonstrate normal blood flow and compression.  The femoral vein is occluded throughout its length, as is the  popliteal vein, with extension into the posterior tibial vein.  Peroneal vein is compressible. Occlusive thrombus also in  gastrocnemius and soleal veins. Contralateral left common femoral vein  is patent.                                                                      IMPRESSION: Persistent occlusive thrombus throughout the right femoral  and popliteal veins. However, degree of thrombosis appears to have  progressed into the posterior, gastrocnemius, and soleal veins.     CHRISTEN URIAS MD    A/P:  (I82.511) Deep vein thrombosis (DVT) of femoral vein of right lower extremity  5/2019 (H) 2nd life time DVT ( initial 2011)  (primary encounter diagnosis)  (Total 2 episodes of DVTs in the lifetime and both of them appears provoked)    Comment: He underwent suction thrombectomy with Inari device on May 23, 2019, initially good response  But underwent follow-up 1 month right lower  extremity venous duplex which showed still persistent DVT in the femoral vein and popliteal vein  Last visit switched to xarelto 20 mg tolerating  Venous duplex improving clot   Requesting 90 day refill  He denies any chest pain, shortness of breath or palpitations   Maternal aunt with history of factor V Leyden mutation  Hypercoagulable studies are negative  Leg is well perfused palpable pulses, motor or sensory function is normal    Plan:  Continue xarelto 20 daily  Discussed again side effects, patient to call back clinic if he develops any issues with xarelto  Use compression stockings thigh-high and elevate the legs  During prolonged travel suggested patient to every 2 hours walk around for few minutes and stretch the legs etc    (E78.5) Hyperlipidemia LDL goal <70  Good response with statin continue the same and TLC suggested  Comment: Continue statin and repeat lipid panel in 3 months  Plan:     (I10) Benign essential hypertension  On low-dose lisinopril tolerating without any problems.  No more side effects  Continue same    I have discussed with patient the risks, benefits, medications, treatment options and modalities.   I have instructed the patient to call or schedule a follow-up appointment if any problems or failure to improve.    Total patient care time spent today 25  minutes face-to-face, more than 50% of the time spent counseling of the above-mentioned multiple issues.  Patient  had a lot of questions and all of them were answered.     Return to clinic in 6 months or sooner if issues/      Charles Mckeon MD,[facility]  Vascular Medicine

## 2019-12-31 ENCOUNTER — E-VISIT (OUTPATIENT)
Dept: FAMILY MEDICINE | Facility: CLINIC | Age: 54
End: 2019-12-31
Payer: COMMERCIAL

## 2019-12-31 ENCOUNTER — NURSE TRIAGE (OUTPATIENT)
Dept: NURSING | Facility: CLINIC | Age: 54
End: 2019-12-31

## 2019-12-31 ENCOUNTER — MYC MEDICAL ADVICE (OUTPATIENT)
Dept: FAMILY MEDICINE | Facility: CLINIC | Age: 54
End: 2019-12-31

## 2019-12-31 ENCOUNTER — TELEPHONE (OUTPATIENT)
Dept: FAMILY MEDICINE | Facility: CLINIC | Age: 54
End: 2019-12-31

## 2019-12-31 DIAGNOSIS — M10.9 GOUT, UNSPECIFIED CAUSE, UNSPECIFIED CHRONICITY, UNSPECIFIED SITE: Primary | ICD-10-CM

## 2019-12-31 PROCEDURE — 99207 ZZC NO BILLABLE SERVICE THIS VISIT: CPT | Performed by: FAMILY MEDICINE

## 2019-12-31 NOTE — TELEPHONE ENCOUNTER
Patient reports he has gout symptoms and reached out to the clinic earlier today. He was told he either needs to be seen or to complete an e-visit. Patient completed an e-visit and still hasn't heard back from the clinic. He was told the e-visit will be finished today.     Advised patient that typically e-visits get addressed within 24 hours. And [email or web address] visits you get a treatment plan within one hour.     Patient was very upset, and questioned doing [employer] tonight because he is worried he will still get charged for the e-visit.      Patient states he will suffer through the pain. And again verbalized how upset he was, he feels like this is something simple that could have been taken care of.     Jessica Wong, RN/[facility] Nurse Advisors    Reason for Disposition    General information question, no triage required and triager able to answer question    Protocols used: INFORMATION ONLY CALL-A-AH

## 2019-12-31 NOTE — TELEPHONE ENCOUNTER
LOV: 4/18/2019    [facility]    Iirndomethacin (INDOCIN) 50 MG capsule (Discontinued)  Therapy completed on 4/18/2019    Patient needs appt. Or E-visit for symptoms.     Spoke with patient and E-visit will be initiated to covering provider.     Thanks! Adelina Burnett RN

## 2019-12-31 NOTE — TELEPHONE ENCOUNTER
Reason for Call:  Medication or medication refill:    Do you use a [facility]?  Name of the pharmacy and phone number for the current request:  [facility]/pharmacy #[id number] - [facility] [address]     Name of the medication requested: indomethacin (INDOCIN) 50 MG capsule    Other request: Patient states that he's having a gout flare up & is unable to get his shoe on. He would like a script sent in asa. Please advise, thank you!    Can we leave a detailed message on this number? YES    Phone number patient can be reached at: Home number on file [phone] (home)    Best Time: anytime    Call taken on 12/31/2019 at 11:21 AM by Erika Lira

## 2020-01-02 NOTE — TELEPHONE ENCOUNTER
Noa Infante MD/Hp Providers:  Patient submitted E-Visit 12/31/2019    Patient reports his symptoms are better and he does not want to be charged for E-Visit    Please disregard patient's E-Visit submission    Thank You!  Viktoriya Porter, KENN  Triage Nurse

## 2020-01-29 ENCOUNTER — TELEPHONE (OUTPATIENT)
Dept: OTHER | Facility: CLINIC | Age: 55
End: 2020-01-29

## 2020-01-29 NOTE — TELEPHONE ENCOUNTER
Pt has been to see Dr. Mckeon several times over the past 6 months.   He didn't realize the [facility] is a hospital based clinic and therefore has a facility component to his visit.   After some discussion he verbalized understanding the facility portion is his responsibility as much as the professional portion.

## 2020-07-21 ENCOUNTER — TELEPHONE (OUTPATIENT)
Dept: OTHER | Facility: CLINIC | Age: 55
End: 2020-07-21

## 2020-07-21 NOTE — TELEPHONE ENCOUNTER
Adelfo is due for a 6 month follow up per gold sheet received.     Contacted Adelfo to schedule :     7/21/2020 Adelfo is going to look into cost as he has not met deductables and can not afford a large medical bill at this time. Visit is virtual.       Adelfo was given the phone number to look into billing costs and will call back if/when ready to schedule.     Adelfo needs virtual or IP OV with Dr. Mckeon. No labs/imaging prior.       Trisha RAMOS

## 2020-07-30 ENCOUNTER — TELEPHONE (OUTPATIENT)
Dept: OTHER | Facility: CLINIC | Age: 55
End: 2020-07-30

## 2020-07-30 NOTE — TELEPHONE ENCOUNTER
Adelfo said that he is eligible for a [facility]'s Rx assistance for $85 per month.    He will be eligible for 90 days for $10 at the beginning of the year again.    [phone] (Anupama) will be calling to request this Rx.    Vonda HAWK BSN  [facility]  [phone]

## 2020-07-30 NOTE — TELEPHONE ENCOUNTER
Patient requested [facility] trial offer coupons - both Free 30 day trial and the $10 copay card.    I contacted the [facility] rep, Jonnathan Parr, who will send more of these cards to the clinic.    In the mean time, Jonnathan suggested I give the patient the Network phone number to get signed up.    Jonnathan provided me with [phone].  I also gave Adelfo the number on the text coupon: [phone].    Vonda HAWK BSN  [facility]  [phone]

## 2020-08-03 ENCOUNTER — OFFICE VISIT (OUTPATIENT)
Dept: FAMILY MEDICINE | Facility: CLINIC | Age: 55
End: 2020-08-03
Payer: COMMERCIAL

## 2020-08-03 VITALS
WEIGHT: 229 LBS | HEART RATE: 86 BPM | RESPIRATION RATE: 18 BRPM | BODY MASS INDEX: 27.89 KG/M2 | TEMPERATURE: 98.8 F | OXYGEN SATURATION: 99 % | HEIGHT: 76 IN | DIASTOLIC BLOOD PRESSURE: 92 MMHG | SYSTOLIC BLOOD PRESSURE: 133 MMHG

## 2020-08-03 DIAGNOSIS — Z00.00 ROUTINE GENERAL MEDICAL EXAMINATION AT A HEALTH CARE FACILITY: Primary | ICD-10-CM

## 2020-08-03 DIAGNOSIS — Z12.5 SCREENING FOR PROSTATE CANCER: ICD-10-CM

## 2020-08-03 DIAGNOSIS — I10 BENIGN ESSENTIAL HYPERTENSION: ICD-10-CM

## 2020-08-03 DIAGNOSIS — E78.5 HYPERLIPIDEMIA LDL GOAL <100: ICD-10-CM

## 2020-08-03 DIAGNOSIS — M10.9 ACUTE GOUTY ARTHRITIS: ICD-10-CM

## 2020-08-03 DIAGNOSIS — R73.01 IMPAIRED FASTING GLUCOSE: ICD-10-CM

## 2020-08-03 DIAGNOSIS — I82.511 CHRONIC DEEP VEIN THROMBOSIS (DVT) OF FEMORAL VEIN OF RIGHT LOWER EXTREMITY (H): ICD-10-CM

## 2020-08-03 LAB
BASOPHILS # BLD AUTO: 0 10E9/L (ref 0–0.2)
BASOPHILS NFR BLD AUTO: 0.5 %
DIFFERENTIAL METHOD BLD: NORMAL
EOSINOPHIL # BLD AUTO: 0.4 10E9/L (ref 0–0.7)
EOSINOPHIL NFR BLD AUTO: 5.4 %
ERYTHROCYTE [DISTWIDTH] IN BLOOD BY AUTOMATED COUNT: 13.1 % (ref 10–15)
HBA1C MFR BLD: 5.1 % (ref 0–5.6)
HCT VFR BLD AUTO: 46.5 % (ref 40–53)
HGB BLD-MCNC: 15.5 G/DL (ref 13.3–17.7)
LYMPHOCYTES # BLD AUTO: 1.4 10E9/L (ref 0.8–5.3)
LYMPHOCYTES NFR BLD AUTO: 20.9 %
MCH RBC QN AUTO: 29.1 PG (ref 26.5–33)
MCHC RBC AUTO-ENTMCNC: 33.3 G/DL (ref 31.5–36.5)
MCV RBC AUTO: 87 FL (ref 78–100)
MONOCYTES # BLD AUTO: 0.6 10E9/L (ref 0–1.3)
MONOCYTES NFR BLD AUTO: 8.3 %
NEUTROPHILS # BLD AUTO: 4.3 10E9/L (ref 1.6–8.3)
NEUTROPHILS NFR BLD AUTO: 64.9 %
PLATELET # BLD AUTO: 188 10E9/L (ref 150–450)
RBC # BLD AUTO: 5.33 10E12/L (ref 4.4–5.9)
WBC # BLD AUTO: 6.6 10E9/L (ref 4–11)

## 2020-08-03 PROCEDURE — 99396 PREV VISIT EST AGE 40-64: CPT | Performed by: FAMILY MEDICINE

## 2020-08-03 PROCEDURE — 85025 COMPLETE CBC W/AUTO DIFF WBC: CPT | Performed by: FAMILY MEDICINE

## 2020-08-03 PROCEDURE — 83036 HEMOGLOBIN GLYCOSYLATED A1C: CPT | Performed by: FAMILY MEDICINE

## 2020-08-03 PROCEDURE — G0103 PSA SCREENING: HCPCS | Performed by: FAMILY MEDICINE

## 2020-08-03 PROCEDURE — 80061 LIPID PANEL: CPT | Performed by: FAMILY MEDICINE

## 2020-08-03 PROCEDURE — 80053 COMPREHEN METABOLIC PANEL: CPT | Performed by: FAMILY MEDICINE

## 2020-08-03 PROCEDURE — 99213 OFFICE O/P EST LOW 20 MIN: CPT | Mod: 25 | Performed by: FAMILY MEDICINE

## 2020-08-03 PROCEDURE — 36415 COLL VENOUS BLD VENIPUNCTURE: CPT | Performed by: FAMILY MEDICINE

## 2020-08-03 PROCEDURE — 84550 ASSAY OF BLOOD/URIC ACID: CPT | Performed by: FAMILY MEDICINE

## 2020-08-03 RX ORDER — LISINOPRIL 10 MG/1
10 TABLET ORAL DAILY
Qty: 90 TABLET | Refills: 3
Start: 2020-08-03 | End: 2020-09-09

## 2020-08-03 ASSESSMENT — MIFFLIN-ST. JEOR: SCORE: 1970.24

## 2020-08-03 NOTE — PROGRESS NOTES
SUBJECTIVE:   CC: Adelfo Rivera is an 55 year old male who presents for preventive health visit.     Healthy Habits:    Do you get at least three servings of calcium containing foods daily (dairy, green leafy vegetables, etc.)? yes    Amount of exercise or daily activities, outside of work: 4 day(s) per week    Problems taking medications regularly No    Medication side effects: Yes body aches     Have you had an eye exam in the past two years? yes    Do you see a dentist twice per year? yes    Do you have sleep apnea, excessive snoring or daytime drowsiness?no    Current Chronic Medical Conditions:  Hyperlipidemia on statin  HTN  impaired fasting glucose  Gout  Chronic DVT RLE s/p embolectomy-- recollected- now on chronic Xarelto-- was followed by Vascular Dr. Mckeon but insurance issues are preventing him from returning    Past Medical History  LLE DVT  of unknown etiology-- on warfarin x 3 months    Family History  Dad-  age 82 from CAD, s/p heart valve replacement, CVA at age 71  Mom- alive and healthy  4 sisters healthy    Social History   to wife Sarah (previous kidney cancer  rxed at [facility]). 2 kids-- age 25F Vani(step) and 19F Delia(biological). Works as  for [employer]- travels long distances in car frequently for job. Nonsmoker.     HCM  Colonoscopy  at [facility] q 10 year Follow-up    Additional Concerns  Due for lipids today.  Notes moreaches on Crestor vs Zocor previously.  Might want to switch.  Impaired glucose-  Due for recheck today.  HTN- Blood pressure up today-- will go back on 10 mg lisinopril and call in home readings in 2 weeks  Gout has been stable.  Recheck uric acid today.  Increased LEFT knee pain- had 3 cortisone shots with minimal relief-- got SynVisc1 in 2019 and still feeling okay.  Seeing [place] in [place].  Needs refill of Xarelto- increased expense being seen at [facility] due to insurance  co-pays            Today's PHQ-2 Score:   PHQ-2 ( 1999 [employer]) 8/3/2020 4/18/2019   Q1: Little interest or pleasure in doing things 0 0   Q2: Feeling down, depressed or hopeless 0 0   PHQ-2 Score 0 0   Q1: Little interest or pleasure in doing things - Not at all   Q2: Feeling down, depressed or hopeless - Not at all   PHQ-2 Score - 0     Abuse: Current or Past(Physical, Sexual or Emotional)- No  Do you feel safe in your environment? Yes    Have you ever done Advance Care Planning? (For example, a Health Directive, POLST, or a discussion with a medical provider or your loved ones about your wishes): Yes, advance care planning is on file.    Social History     Tobacco Use     Smoking status: Never Smoker     Smokeless tobacco: Never Used   Substance Use Topics     Alcohol use: Yes     Comment: once in a while      If you drink alcohol do you typically have >3 drinks per day or >7 drinks per week? No                      Last PSA:   PSA   Date Value Ref Range Status   04/18/2019 0.95 0 - 4 ug/L Final     Comment:     Assay Method:  Chemiluminescence using Siemens Vista analyzer       Reviewed orders with patient. Reviewed health maintenance and updated orders accordingly - Yes  BP Readings from Last 3 Encounters:   12/19/19 (!) 137/91   10/03/19 117/81   08/20/19 128/86    Wt Readings from Last 3 Encounters:   12/19/19 104.3 kg (230 lb)   10/03/19 102.1 kg (225 lb)   08/20/19 102.8 kg (226 lb 9.6 oz)                  Patient Active Problem List   Diagnosis     Hyperlipidemia LDL goal <100     Acute gouty arthritis     Benign essential hypertension     Impaired fasting glucose     Proximal DVT Right leg     History of Left Leg DVT 2011     Past Surgical History:   Procedure Laterality Date     IR LOWER EXTREMITY VENOGRAM RIGHT  5/24/2019       Social History     Tobacco Use     Smoking status: Never Smoker     Smokeless tobacco: Never Used   Substance Use Topics     Alcohol use: Yes     Comment: once in a while       "Family History   Problem Relation Age of Onset     No Known Problems Mother      No Known Problems Father          Current Outpatient Medications   Medication Sig Dispense Refill     lisinopril (PRINIVIL/ZESTRIL) 5 MG tablet Take 1 tablet (5 mg) by mouth daily 90 tablet 3     rivaroxaban ANTICOAGULANT (XARELTO ANTICOAGULANT) 20 MG TABS tablet Take 1 tablet (20 mg) by mouth daily (with dinner) 90 tablet 3     rosuvastatin (CRESTOR) 10 MG tablet TAKE 1 TABLET BY MOUTH EVERY DAY 90 tablet 0     No Known Allergies  Recent Labs   Lab Test 09/27/19  0801 05/24/19  0810 04/18/19  0821 05/24/18  1005   A1C  --   --  5.0 5.1   LDL 98  --  149* 107*   HDL 63  --  55 37*   TRIG 51  --  141 167*   ALT  --  15 22 20   CR  --  0.91 0.87 1.09   GFRESTIMATED  --  >90 >90 70   GFRESTBLACK  --  >90 >90 85   POTASSIUM  --  4.0 4.2 4.9        Reviewed and updated as needed this visit by clinical staff         Reviewed and updated as needed this visit by Provider            ROS:  CONSTITUTIONAL: NEGATIVE for fever, chills, change in weight  INTEGUMENTARY/SKIN: NEGATIVE for worrisome rashes, moles or lesions  EYES: NEGATIVE for vision changes or irritation  ENT: NEGATIVE for ear, mouth and throat problems  RESP: NEGATIVE for significant cough or SOB  CV: NEGATIVE for chest pain, palpitations or peripheral edema  GI: NEGATIVE for nausea, abdominal pain, heartburn, or change in bowel habits   male: negative for dysuria, hematuria, decreased urinary stream, erectile dysfunction, urethral discharge  MUSCULOSKELETAL: NEGATIVE for significant arthralgias or myalgia  NEURO: NEGATIVE for weakness, dizziness or paresthesias  PSYCHIATRIC: NEGATIVE for changes in mood or affect    OBJECTIVE:   BP (!) 133/92 (BP Location: Left arm, Patient Position: Sitting, Cuff Size: Adult Regular)   Pulse 86   Temp 98.8  F (37.1  C) (Oral)   Resp 18   Ht 1.93 m (6' 4\")   Wt 103.9 kg (229 lb)   SpO2 99%   BMI 27.87 kg/m      EXAM:  GENERAL: healthy, alert " and no distress  EYES: Eyes grossly normal to inspection, PERRL and conjunctivae and sclerae normal  HENT: ear canals and TM's normal, nose and mouth without ulcers or lesions  NECK: no adenopathy, no asymmetry, masses, or scars and thyroid normal to palpation  RESP: lungs clear to auscultation - no rales, rhonchi or wheezes  CV: regular rate and rhythm, normal S1 S2, no S3 or S4, no murmur, click or rub  ABDOMEN: soft, nontender, no hepatosplenomegaly, no masses and bowel sounds normal  MS: no gross musculoskeletal defects noted, trace BLE edema  SKIN: no suspicious lesions or rashes  NEURO: Normal strength and tone, mentation intact and speech normal  PSYCH: mentation appears normal, affect normal/bright      ASSESSMENT/PLAN:   1. Routine general medical examination at a health care facility    - CBC with platelets differential    HCM UTD.  Continue improved diet and exercise and weight loss.    2. Screening for prostate cancer    - PSA, screen    PSA today.    3. Hyperlipidemia LDL goal <100    - Comprehensive metabolic panel  - Lipid panel reflex to direct LDL Fasting    If at goal- any still achey-- may change to Lipitor and see how this is tolerated.  Patient to decide after labs are known.    4. Impaired fasting glucose    - Comprehensive metabolic panel  - Hemoglobin A1c    Recheck today.    5. Benign essential hypertension    - Comprehensive metabolic panel  - lisinopril (ZESTRIL) 10 MG tablet; Take 1 tablet (10 mg) by mouth daily  Dispense: 90 tablet; Refill: 3    Increase to 10 mg-- monitor at home to be < 130/80.  Call in readings in next 2 weeks.    6. Acute gouty arthritis    - Uric acid    Recheck level.    7. Chronic deep vein thrombosis (DVT) of femoral vein of right lower extremity  5/2019 (H) 2nd life time DVT ( initial 2011)    - rivaroxaban ANTICOAGULANT (XARELTO ANTICOAGULANT) 20 MG TABS tablet; Take 1 tablet (20 mg) by mouth daily (with dinner)  Dispense: 90 tablet; Refill: 3    Will take over  "rx for Vascular for patient due to insurance issues.    COUNSELING:  Reviewed preventive health counseling, as reflected in patient instructions       Regular exercise       Healthy diet/nutrition       Prostate cancer screening    Estimated body mass index is 28 kg/m  as calculated from the following:    Height as of 12/19/19: 1.93 m (6' 4\").    Weight as of 12/19/19: 104.3 kg (230 lb).    Weight management plan: Discussed healthy diet and exercise guidelines     reports that he has never smoked. He has never used smokeless tobacco.      Counseling Resources:  ATP IV Guidelines  Pooled Cohorts Equation Calculator  FRAX Risk Assessment  ICSI Preventive Guidelines  Dietary Guidelines for Americans, 2010  USDA's MyPlate  ASA Prophylaxis  Lung CA Screening    Gemma Infante MD  [facility]  "

## 2020-08-03 NOTE — Clinical Note
Robin wanted you to know he is disappointed due to insurance co-pays he cannot see you at this time.  I will take over his Xarelto rx,  KK

## 2020-08-04 LAB
ALBUMIN SERPL-MCNC: 3.8 G/DL (ref 3.4–5)
ALP SERPL-CCNC: 74 U/L (ref 40–150)
ALT SERPL W P-5'-P-CCNC: 22 U/L (ref 0–70)
ANION GAP SERPL CALCULATED.3IONS-SCNC: 6 MMOL/L (ref 3–14)
AST SERPL W P-5'-P-CCNC: 15 U/L (ref 0–45)
BILIRUB SERPL-MCNC: 0.7 MG/DL (ref 0.2–1.3)
BUN SERPL-MCNC: 9 MG/DL (ref 7–30)
CALCIUM SERPL-MCNC: 9 MG/DL (ref 8.5–10.1)
CHLORIDE SERPL-SCNC: 106 MMOL/L (ref 94–109)
CHOLEST SERPL-MCNC: 167 MG/DL
CO2 SERPL-SCNC: 26 MMOL/L (ref 20–32)
CREAT SERPL-MCNC: 0.82 MG/DL (ref 0.66–1.25)
GFR SERPL CREATININE-BSD FRML MDRD: >90 ML/MIN/{1.73_M2}
GLUCOSE SERPL-MCNC: 87 MG/DL (ref 70–99)
HDLC SERPL-MCNC: 44 MG/DL
LDLC SERPL CALC-MCNC: 83 MG/DL
NONHDLC SERPL-MCNC: 123 MG/DL
POTASSIUM SERPL-SCNC: 4 MMOL/L (ref 3.4–5.3)
PROT SERPL-MCNC: 7.5 G/DL (ref 6.8–8.8)
PSA SERPL-ACNC: 0.92 UG/L (ref 0–4)
SODIUM SERPL-SCNC: 138 MMOL/L (ref 133–144)
TRIGL SERPL-MCNC: 200 MG/DL
URATE SERPL-MCNC: 6.7 MG/DL (ref 3.5–7.2)

## 2020-08-05 DIAGNOSIS — I82.511 CHRONIC DEEP VEIN THROMBOSIS (DVT) OF FEMORAL VEIN OF RIGHT LOWER EXTREMITY (H): ICD-10-CM

## 2020-08-05 NOTE — TELEPHONE ENCOUNTER
Request to transfer Xarelto to [facility] through [facility] assistance program.      Phone Ger [phone]  Fax [phone]    Vonda HAWK BSN  [facility]  [phone]

## 2020-09-09 ENCOUNTER — TELEPHONE (OUTPATIENT)
Dept: FAMILY MEDICINE | Facility: CLINIC | Age: 55
End: 2020-09-09

## 2020-09-09 DIAGNOSIS — E78.5 HYPERLIPIDEMIA LDL GOAL <100: ICD-10-CM

## 2020-09-09 DIAGNOSIS — I10 BENIGN ESSENTIAL HYPERTENSION: ICD-10-CM

## 2020-09-09 RX ORDER — LISINOPRIL 10 MG/1
10 TABLET ORAL DAILY
Qty: 90 TABLET | Refills: 3 | Status: SHIPPED | OUTPATIENT
Start: 2020-09-09 | End: 2020-09-10

## 2020-09-09 NOTE — TELEPHONE ENCOUNTER
Patient called stating his Lisinopril script wasn't sent to the pharmacy and needs asap    Patient also report he is having some muscle pain from taking the Rosuvastatin and would like to try the Simvastatin again however, asking if he should take 10 MG or 20 MG?     Patient states he has both of the 10 MG & 20 MG of Simvastatin just not sure which one to take?    Please advise and ok to leave a message

## 2020-09-10 RX ORDER — LISINOPRIL 10 MG/1
10 TABLET ORAL DAILY
Qty: 90 TABLET | Refills: 3 | Status: SHIPPED | OUTPATIENT
Start: 2020-09-10 | End: 2021-04-21

## 2020-09-10 RX ORDER — SIMVASTATIN 20 MG
20 TABLET ORAL AT BEDTIME
Qty: 90 TABLET | Refills: 3 | COMMUNITY
Start: 2020-09-10 | End: 2020-10-15

## 2020-09-10 NOTE — TELEPHONE ENCOUNTER
Detailed message left on pt's personally identified phone with MD instructions    Med list updated as well    Farzaneh Blue, RN, BSN

## 2020-09-29 DIAGNOSIS — E78.5 HYPERLIPIDEMIA LDL GOAL <100: ICD-10-CM

## 2020-10-01 DIAGNOSIS — E78.5 HYPERLIPIDEMIA LDL GOAL <100: ICD-10-CM

## 2020-10-01 RX ORDER — ROSUVASTATIN CALCIUM 10 MG/1
TABLET, COATED ORAL
Qty: 90 TABLET | Refills: 0 | OUTPATIENT
Start: 2020-10-01

## 2020-10-01 RX ORDER — SIMVASTATIN 10 MG
TABLET ORAL
Qty: 90 TABLET | Refills: 3 | OUTPATIENT
Start: 2020-10-01

## 2020-10-15 ENCOUNTER — MYC MEDICAL ADVICE (OUTPATIENT)
Dept: FAMILY MEDICINE | Facility: CLINIC | Age: 55
End: 2020-10-15

## 2020-10-15 ENCOUNTER — TELEPHONE (OUTPATIENT)
Dept: FAMILY MEDICINE | Facility: CLINIC | Age: 55
End: 2020-10-15

## 2020-10-15 DIAGNOSIS — E78.5 HYPERLIPIDEMIA LDL GOAL <100: ICD-10-CM

## 2020-10-15 RX ORDER — SIMVASTATIN 20 MG
20 TABLET ORAL AT BEDTIME
Qty: 90 TABLET | Refills: 3 | Status: SHIPPED | OUTPATIENT
Start: 2020-10-15 | End: 2021-04-21

## 2020-10-15 NOTE — TELEPHONE ENCOUNTER
My chrt msg sent to see how pt is feeling on the zocor  And I resent the 20mg- notsure why [facility] couldn't find the rx  Marilyn Dawkins RN

## 2020-11-06 ENCOUNTER — TELEPHONE (OUTPATIENT)
Dept: FAMILY MEDICINE | Facility: CLINIC | Age: 55
End: 2020-11-06

## 2020-11-06 NOTE — TELEPHONE ENCOUNTER
Reason for Call:  Other Clarification    Detailed comments: patient was on 8-3-20 for a routine physical and has questions regarding the coding and cost of the visit.    Patient states he received an expensive bill and already talked to the billing office.    He was directed to the care team to explain; please call back.    Phone Number Patient can be reached at: Home number on file [phone] (home)    Best Time: asap    Can we leave a detailed message on this number? YES    Call taken on 11/6/2020 at 11:10 AM by Carla Smith

## 2020-12-08 NOTE — TELEPHONE ENCOUNTER
Patient is requesting to speak with nurse regarding some clarification about an appointment. State he called and left a message on 11/06 and no one has called him back. Please call patient back at [phone] to discuss. Thank You    Dena Salguero on 12/8/2020 at 11:47 AM

## 2020-12-08 NOTE — TELEPHONE ENCOUNTER
"Patient told to reach out to PCP after speaking to coding, billing and his insurance    Physical on 8/3/20    Patient states has high cholesterol and has always addressed this issues at every physical with Dr. Infante      Insurance paid for physical but stated that because hyperlipidemia was coded outside of physical he was charged  $249.10    Patient spoke to billing who directed patient to his insurance--they informed him that physical covered-hyperlipidemia was not-    Patient spoke to coding who stated the coding was correct and patient should reach out to provider.    Billed for office visit for Hyperlipidemia    Writer states     Patient asking \"What did I do differently this time?\"   Patient asking for clarification as he wants to avoid this kind of bill in the future.    Please advise.    Thanks! Adelina Burnett RN      "

## 2020-12-09 NOTE — TELEPHONE ENCOUNTER
"Please educate that this is a new standard in coding and billing for all primary care clinics.    A preventive code only covers preventive services- vaccines, cancer screens, colonoscopy, etc    Any additional diagnoses outside of the preventive code now are coded and billed separately-- so a review of chronic conditions and labs or refills needed to manage those are coded outside of the annual preventive exam as are acute concerns that patient may ask to also address on day of visit.  This is a change.  It can be looked at as being able to accomplish two things at one visit-- but now patients are charged for this extra work we used to do for \"free\".    I liken it to a 50,0000 mile check up for a car-- there is a certain list of things that are done for servicing the car that day.  Anything extra is billed in addition-- if you need new wiper blades- you pay for those in addition- or if there is a new sound the car is making- there is an extra charge for this to be evaluated.    It is a change- but it allows the primary care providers to be paid for the work that is done in these visits.  People historically would \"save\" all of their complaints and concerns for their physical exam knowing it was their one \"free\" visit- and now because we are the only \"specialty\" that does these wellness exams (as well as  OBGYN and PEDS and INTERNAL MEDICINE)- the coding and billing rules have now aligned to separate what the preventive exam is and what office visit work was done.    Another way to look at it is anything extra that was done that would have been done as a simple office visit and charged for such is and can be done at time of the physical but the electronic record now can divide this out and the patient no longer can come in with a list and not be expected to be charged for the work done.    Thanks for explaining this to him!    "

## 2021-04-19 ASSESSMENT — ENCOUNTER SYMPTOMS
COUGH: 0
WEAKNESS: 0
CHILLS: 0
HEADACHES: 0
CONSTIPATION: 0
SHORTNESS OF BREATH: 0
FEVER: 0
MYALGIAS: 1
NERVOUS/ANXIOUS: 0
JOINT SWELLING: 0
HEMATURIA: 0
DIARRHEA: 0
DYSURIA: 0
FREQUENCY: 0
EYE PAIN: 0
DIZZINESS: 0
HEARTBURN: 0
PARESTHESIAS: 0
NAUSEA: 0
HEMATOCHEZIA: 0
PALPITATIONS: 0
ABDOMINAL PAIN: 0
SORE THROAT: 0
ARTHRALGIAS: 1

## 2021-04-20 ENCOUNTER — OFFICE VISIT (OUTPATIENT)
Dept: FAMILY MEDICINE | Facility: CLINIC | Age: 56
End: 2021-04-20
Payer: COMMERCIAL

## 2021-04-20 VITALS
SYSTOLIC BLOOD PRESSURE: 124 MMHG | DIASTOLIC BLOOD PRESSURE: 86 MMHG | TEMPERATURE: 96.3 F | BODY MASS INDEX: 27.75 KG/M2 | HEIGHT: 77 IN | HEART RATE: 90 BPM | RESPIRATION RATE: 16 BRPM | WEIGHT: 235 LBS | OXYGEN SATURATION: 98 %

## 2021-04-20 DIAGNOSIS — Z00.00 ROUTINE HISTORY AND PHYSICAL EXAMINATION OF ADULT: ICD-10-CM

## 2021-04-20 DIAGNOSIS — I10 BENIGN ESSENTIAL HYPERTENSION: ICD-10-CM

## 2021-04-20 DIAGNOSIS — I82.511 CHRONIC DEEP VEIN THROMBOSIS (DVT) OF FEMORAL VEIN OF RIGHT LOWER EXTREMITY (H): ICD-10-CM

## 2021-04-20 DIAGNOSIS — E78.5 HYPERLIPIDEMIA LDL GOAL <100: Primary | ICD-10-CM

## 2021-04-20 DIAGNOSIS — Z12.5 SCREENING FOR PROSTATE CANCER: ICD-10-CM

## 2021-04-20 DIAGNOSIS — Z12.11 SCREEN FOR COLON CANCER: ICD-10-CM

## 2021-04-20 LAB
ALBUMIN SERPL-MCNC: 4.1 G/DL (ref 3.4–5)
ALP SERPL-CCNC: 76 U/L (ref 40–150)
ALT SERPL W P-5'-P-CCNC: 21 U/L (ref 0–70)
ANION GAP SERPL CALCULATED.3IONS-SCNC: 4 MMOL/L (ref 3–14)
AST SERPL W P-5'-P-CCNC: 13 U/L (ref 0–45)
BASOPHILS # BLD AUTO: 0 10E9/L (ref 0–0.2)
BASOPHILS NFR BLD AUTO: 0.4 %
BILIRUB SERPL-MCNC: 0.5 MG/DL (ref 0.2–1.3)
BUN SERPL-MCNC: 16 MG/DL (ref 7–30)
CALCIUM SERPL-MCNC: 9.2 MG/DL (ref 8.5–10.1)
CHLORIDE SERPL-SCNC: 105 MMOL/L (ref 94–109)
CHOLEST SERPL-MCNC: 193 MG/DL
CO2 SERPL-SCNC: 28 MMOL/L (ref 20–32)
CREAT SERPL-MCNC: 0.97 MG/DL (ref 0.66–1.25)
DIFFERENTIAL METHOD BLD: NORMAL
EOSINOPHIL # BLD AUTO: 0.2 10E9/L (ref 0–0.7)
EOSINOPHIL NFR BLD AUTO: 4.6 %
ERYTHROCYTE [DISTWIDTH] IN BLOOD BY AUTOMATED COUNT: 12.8 % (ref 10–15)
GFR SERPL CREATININE-BSD FRML MDRD: 86 ML/MIN/{1.73_M2}
GLUCOSE SERPL-MCNC: 98 MG/DL (ref 70–99)
HCT VFR BLD AUTO: 47.5 % (ref 40–53)
HDLC SERPL-MCNC: 43 MG/DL
HGB BLD-MCNC: 16 G/DL (ref 13.3–17.7)
LDLC SERPL CALC-MCNC: 114 MG/DL
LYMPHOCYTES # BLD AUTO: 1 10E9/L (ref 0.8–5.3)
LYMPHOCYTES NFR BLD AUTO: 21.2 %
MCH RBC QN AUTO: 28.7 PG (ref 26.5–33)
MCHC RBC AUTO-ENTMCNC: 33.7 G/DL (ref 31.5–36.5)
MCV RBC AUTO: 85 FL (ref 78–100)
MONOCYTES # BLD AUTO: 0.4 10E9/L (ref 0–1.3)
MONOCYTES NFR BLD AUTO: 8.3 %
NEUTROPHILS # BLD AUTO: 3.1 10E9/L (ref 1.6–8.3)
NEUTROPHILS NFR BLD AUTO: 65.5 %
NONHDLC SERPL-MCNC: 150 MG/DL
PLATELET # BLD AUTO: 210 10E9/L (ref 150–450)
POTASSIUM SERPL-SCNC: 4.3 MMOL/L (ref 3.4–5.3)
PROT SERPL-MCNC: 7.7 G/DL (ref 6.8–8.8)
PSA SERPL-ACNC: 0.93 UG/L (ref 0–4)
RBC # BLD AUTO: 5.57 10E12/L (ref 4.4–5.9)
SODIUM SERPL-SCNC: 137 MMOL/L (ref 133–144)
TRIGL SERPL-MCNC: 182 MG/DL
WBC # BLD AUTO: 4.8 10E9/L (ref 4–11)

## 2021-04-20 PROCEDURE — 80061 LIPID PANEL: CPT | Performed by: FAMILY MEDICINE

## 2021-04-20 PROCEDURE — 99396 PREV VISIT EST AGE 40-64: CPT | Performed by: FAMILY MEDICINE

## 2021-04-20 PROCEDURE — 36415 COLL VENOUS BLD VENIPUNCTURE: CPT | Performed by: FAMILY MEDICINE

## 2021-04-20 PROCEDURE — G0103 PSA SCREENING: HCPCS | Performed by: FAMILY MEDICINE

## 2021-04-20 PROCEDURE — 80053 COMPREHEN METABOLIC PANEL: CPT | Performed by: FAMILY MEDICINE

## 2021-04-20 PROCEDURE — 85025 COMPLETE CBC W/AUTO DIFF WBC: CPT | Performed by: FAMILY MEDICINE

## 2021-04-20 ASSESSMENT — ENCOUNTER SYMPTOMS
CONSTIPATION: 0
HEADACHES: 0
PARESTHESIAS: 0
PALPITATIONS: 0
COUGH: 0
HEMATURIA: 0
NERVOUS/ANXIOUS: 0
CHILLS: 0
HEMATOCHEZIA: 0
WEAKNESS: 0
DYSURIA: 0
ARTHRALGIAS: 1
SHORTNESS OF BREATH: 0
NAUSEA: 0
FREQUENCY: 0
SORE THROAT: 0
DIARRHEA: 0
FEVER: 0
DIZZINESS: 0
HEARTBURN: 0
JOINT SWELLING: 0
MYALGIAS: 1
EYE PAIN: 0
ABDOMINAL PAIN: 0

## 2021-04-20 ASSESSMENT — MIFFLIN-ST. JEOR: SCORE: 2000.39

## 2021-04-20 NOTE — PROGRESS NOTES
SUBJECTIVE:   CC: Adelfo Rivera is an 56 year old male who presents for preventative health visit.   Patient has been advised of split billing requirements and indicates understanding: Yes  Healthy Habits:     Getting at least 3 servings of Calcium per day:  Yes    Bi-annual eye exam:  NO    Dental care twice a year:  Yes    Sleep apnea or symptoms of sleep apnea:  None    Diet:  Regular (no restrictions)    Frequency of exercise:  4-5 days/week    Duration of exercise:  15-30 minutes    Taking medications regularly:  Yes    Medication side effects:  Muscle aches    PHQ-2 Total Score: 0    Additional concerns today:  No    Current Chronic Medical Conditions:  Hyperlipidemia on statin  HTN  impaired fasting glucose  Gout  Chronic DVT RLE s/p embolectomy-- recollected- now on chronic Xarelto-- was followed by Vascular Dr. Mckeon but insurance issues are preventing him from returning    Past Medical History  LLE DVT  of unknown etiology-- on warfarin x 3 months    Family History  Dad-  age 82 from CAD, s/p heart valve replacement, CVA at age 71  Mom- alive and healthy  4 sisters healthy    Social History   to wife Sarah (previous kidney cancer  rxed at [facility]). 2 kids-- Vani(step) and Delia(biological). Works as  for [employer]- travels long distances in car frequently for job. Nonsmoker.     HCM  Colonoscopy  at [facility] q 10 year Follow-up     Additional Concerns  Due for lipids today. Stable on Zocor.  HTN- Blood pressure stable.  Increased LEFT knee pain- had 3 cortisone shots with minimal relief-- got SynVisc1 in 2019 and still feeling okay.  Seeing [place] in [place].    Today's PHQ-2 Score:   PHQ-2 (  [employer]) 2021   Q1: Little interest or pleasure in doing things 0   Q2: Feeling down, depressed or hopeless 0   PHQ-2 Score 0   Q1: Little interest or pleasure in doing things Not at all   Q2: Feeling down, depressed or hopeless Not at all    PHQ-2 Score 0       Abuse: Current or Past(Physical, Sexual or Emotional)- No  Do you feel safe in your environment? Yes        Social History     Tobacco Use     Smoking status: Never Smoker     Smokeless tobacco: Never Used   Substance Use Topics     Alcohol use: Yes     Comment: once in a while      If you drink alcohol do you typically have >3 drinks per day or >7 drinks per week? No    Alcohol Use 4/20/2021   Prescreen: >3 drinks/day or >7 drinks/week? -   Prescreen: >3 drinks/day or >7 drinks/week? No       Last PSA:   PSA   Date Value Ref Range Status   08/03/2020 0.92 0 - 4 ug/L Final     Comment:     Assay Method:  Chemiluminescence using Siemens Vista analyzer       Reviewed orders with patient. Reviewed health maintenance and updated orders accordingly - Yes  BP Readings from Last 3 Encounters:   04/20/21 124/86   08/03/20 (!) 133/92   12/19/19 (!) 137/91    Wt Readings from Last 3 Encounters:   04/20/21 106.6 kg (235 lb)   08/03/20 103.9 kg (229 lb)   12/19/19 104.3 kg (230 lb)                  Patient Active Problem List   Diagnosis     Hyperlipidemia LDL goal <100     Acute gouty arthritis     Benign essential hypertension     Impaired fasting glucose     Proximal DVT Right leg     History of Left Leg DVT 2011     Past Surgical History:   Procedure Laterality Date     IR LOWER EXTREMITY VENOGRAM RIGHT  5/24/2019       Social History     Tobacco Use     Smoking status: Never Smoker     Smokeless tobacco: Never Used   Substance Use Topics     Alcohol use: Yes     Comment: once in a while      Family History   Problem Relation Age of Onset     No Known Problems Mother      No Known Problems Father          Current Outpatient Medications   Medication Sig Dispense Refill     lisinopril (ZESTRIL) 10 MG tablet Take 1 tablet (10 mg) by mouth daily 90 tablet 3     rivaroxaban ANTICOAGULANT (XARELTO ANTICOAGULANT) 20 MG TABS tablet Take 1 tablet (20 mg) by mouth daily (with dinner) 90 tablet 3     simvastatin  "(ZOCOR) 20 MG tablet Take 1 tablet (20 mg) by mouth At Bedtime 90 tablet 3     No Known Allergies  Recent Labs   Lab Test 08/03/20  1408 09/27/19  0801 05/24/19  0810 04/18/19  0821 05/24/18  1005   A1C 5.1  --   --  5.0 5.1   LDL 83 98  --  149* 107*   HDL 44 63  --  55 37*   TRIG 200* 51  --  141 167*   ALT 22  --  15 22 20   CR 0.82  --  0.91 0.87 1.09   GFRESTIMATED >90  --  >90 >90 70   GFRESTBLACK >90  --  >90 >90 85   POTASSIUM 4.0  --  4.0 4.2 4.9        Reviewed and updated as needed this visit by clinical staff  Tobacco  Allergies  Meds   Med Hx  Surg Hx  Fam Hx  Soc Hx        Reviewed and updated as needed this visit by Provider                    Review of Systems   Constitutional: Negative for chills and fever.   HENT: Positive for hearing loss. Negative for congestion, ear pain and sore throat.    Eyes: Negative for pain and visual disturbance.   Respiratory: Negative for cough and shortness of breath.    Cardiovascular: Negative for chest pain, palpitations and peripheral edema.   Gastrointestinal: Negative for abdominal pain, constipation, diarrhea, heartburn, hematochezia and nausea.   Genitourinary: Negative for discharge, dysuria, frequency, genital sores, hematuria, impotence and urgency.   Musculoskeletal: Positive for arthralgias and myalgias. Negative for joint swelling.   Skin: Negative for rash.   Neurological: Negative for dizziness, weakness, headaches and paresthesias.   Psychiatric/Behavioral: Negative for mood changes. The patient is not nervous/anxious.          OBJECTIVE:   /86   Pulse 90   Temp 96.3  F (35.7  C) (Tympanic)   Resp 16   Ht 1.943 m (6' 4.5\")   Wt 106.6 kg (235 lb)   SpO2 98%   BMI 28.23 kg/m      Physical Exam  GENERAL: healthy, alert and no distress  EYES: Eyes grossly normal to inspection, PERRL and conjunctivae and sclerae normal  HENT: ear canals and TM's normal, nose and mouth without ulcers or lesions  NECK: no adenopathy, no asymmetry, masses, " "or scars and thyroid normal to palpation  RESP: lungs clear to auscultation - no rales, rhonchi or wheezes  CV: regular rate and rhythm, normal S1 S2, no S3 or S4, no murmur, click or rub, no peripheral edema and peripheral pulses strong  ABDOMEN: soft, nontender, no hepatosplenomegaly, no masses and bowel sounds normal  MS: no gross musculoskeletal defects noted, no edema  SKIN: no suspicious lesions or rashes  NEURO: Normal strength and tone, mentation intact and speech normal  PSYCH: mentation appears normal, affect normal/bright    ASSESSMENT/PLAN:   1. Routine history and physical examination of adult    - CBC with platelets differential  - Comprehensive metabolic panel  - Lipid panel reflex to direct LDL Fasting  - Prostate spec antigen screen    Fasting labs today- continue good diet and exercise.    2. Screening for prostate cancer    - Prostate spec antigen screen    3. Screen for colon cancer    - GASTROENTEROLOGY ADULT REF PROCEDURE ONLY; Future    4. Benign essential hypertension    - Comprehensive metabolic panel  - Lipid panel reflex to direct LDL Fasting  - Compression Sleeve/Stocking Order for DME - ONLY FOR DME    Stable on current regimen.    5. Hyperlipidemia LDL goal <100    - Comprehensive metabolic panel  - Lipid panel reflex to direct LDL Fasting    Stable on statin.    6. Chronic deep vein thrombosis (DVT) of femoral vein of right lower extremity (H)    - Compression Sleeve/Stocking Order for DME - ONLY FOR DME    Stable on lifelong Xarelto.    Patient has been advised of split billing requirements and indicates understanding: Yes  COUNSELING:   Reviewed preventive health counseling, as reflected in patient instructions       Regular exercise       Healthy diet/nutrition    Estimated body mass index is 28.23 kg/m  as calculated from the following:    Height as of this encounter: 1.943 m (6' 4.5\").    Weight as of this encounter: 106.6 kg (235 lb).     Weight management plan: Discussed healthy " diet and exercise guidelines    He reports that he has never smoked. He has never used smokeless tobacco.      Counseling Resources:  ATP IV Guidelines  Pooled Cohorts Equation Calculator  FRAX Risk Assessment  ICSI Preventive Guidelines  Dietary Guidelines for Americans, 2010  USDA's MyPlate  ASA Prophylaxis  Lung CA Screening    Gemma Infante MD  [facility]

## 2021-04-21 DIAGNOSIS — I10 BENIGN ESSENTIAL HYPERTENSION: ICD-10-CM

## 2021-04-21 DIAGNOSIS — I82.511 CHRONIC DEEP VEIN THROMBOSIS (DVT) OF FEMORAL VEIN OF RIGHT LOWER EXTREMITY (H): ICD-10-CM

## 2021-04-21 DIAGNOSIS — E78.5 HYPERLIPIDEMIA LDL GOAL <100: ICD-10-CM

## 2021-04-21 RX ORDER — SIMVASTATIN 20 MG
20 TABLET ORAL AT BEDTIME
Qty: 90 TABLET | Refills: 3 | Status: SHIPPED | OUTPATIENT
Start: 2021-04-21

## 2021-04-21 RX ORDER — LISINOPRIL 10 MG/1
10 TABLET ORAL DAILY
Qty: 90 TABLET | Refills: 3 | Status: SHIPPED | OUTPATIENT
Start: 2021-04-21

## 2021-09-04 ENCOUNTER — HEALTH MAINTENANCE LETTER (OUTPATIENT)
Age: 56
End: 2021-09-04

## 2022-06-11 ENCOUNTER — HEALTH MAINTENANCE LETTER (OUTPATIENT)
Age: 57
End: 2022-06-11

## 2022-10-22 ENCOUNTER — HEALTH MAINTENANCE LETTER (OUTPATIENT)
Age: 57
End: 2022-10-22

## 2023-06-18 ENCOUNTER — HEALTH MAINTENANCE LETTER (OUTPATIENT)
Age: 58
End: 2023-06-18

## 2024-09-17 NOTE — TELEPHONE ENCOUNTER
"Order was signed yesterday by Dr. Infante.     \"The correct information is a referral to [facility] to see Bill Hawkins PA-C. Yes, [facility] is in the [facility] network. Please fax Order for left knee cortisone injection to [phone].     Arina Quintanilla Referral Rep\"    Faxed to # provided. Successful.   Patient notified. Patient will call to see if received.     Thanks! Adelina Burnett RN    " none